# Patient Record
Sex: FEMALE | Race: WHITE | NOT HISPANIC OR LATINO | URBAN - METROPOLITAN AREA
[De-identification: names, ages, dates, MRNs, and addresses within clinical notes are randomized per-mention and may not be internally consistent; named-entity substitution may affect disease eponyms.]

---

## 2017-01-07 ENCOUNTER — OUTPATIENT (OUTPATIENT)
Dept: OUTPATIENT SERVICES | Facility: HOSPITAL | Age: 67
LOS: 1 days | End: 2017-01-07
Payer: COMMERCIAL

## 2017-01-07 ENCOUNTER — APPOINTMENT (OUTPATIENT)
Dept: MAMMOGRAPHY | Facility: CLINIC | Age: 67
End: 2017-01-07

## 2017-01-07 DIAGNOSIS — Z98.89 OTHER SPECIFIED POSTPROCEDURAL STATES: Chronic | ICD-10-CM

## 2017-01-07 DIAGNOSIS — Z00.8 ENCOUNTER FOR OTHER GENERAL EXAMINATION: ICD-10-CM

## 2017-01-07 DIAGNOSIS — Z90.89 ACQUIRED ABSENCE OF OTHER ORGANS: Chronic | ICD-10-CM

## 2017-01-07 DIAGNOSIS — Z90.710 ACQUIRED ABSENCE OF BOTH CERVIX AND UTERUS: Chronic | ICD-10-CM

## 2017-01-07 PROCEDURE — 77067 SCR MAMMO BI INCL CAD: CPT

## 2017-01-07 PROCEDURE — 77063 BREAST TOMOSYNTHESIS BI: CPT | Mod: 26

## 2017-01-07 PROCEDURE — 77063 BREAST TOMOSYNTHESIS BI: CPT

## 2017-01-07 PROCEDURE — G0202: CPT | Mod: 26

## 2017-04-23 ENCOUNTER — EMERGENCY (EMERGENCY)
Facility: HOSPITAL | Age: 67
LOS: 1 days | Discharge: HOME | End: 2017-04-23
Attending: EMERGENCY MEDICINE | Admitting: EMERGENCY MEDICINE
Payer: COMMERCIAL

## 2017-04-23 VITALS
RESPIRATION RATE: 15 BRPM | OXYGEN SATURATION: 98 % | HEART RATE: 78 BPM | SYSTOLIC BLOOD PRESSURE: 156 MMHG | TEMPERATURE: 98 F | DIASTOLIC BLOOD PRESSURE: 81 MMHG

## 2017-04-23 VITALS
RESPIRATION RATE: 15 BRPM | TEMPERATURE: 98 F | HEART RATE: 79 BPM | DIASTOLIC BLOOD PRESSURE: 81 MMHG | OXYGEN SATURATION: 99 % | SYSTOLIC BLOOD PRESSURE: 156 MMHG | WEIGHT: 156.09 LBS

## 2017-04-23 DIAGNOSIS — Z98.89 OTHER SPECIFIED POSTPROCEDURAL STATES: Chronic | ICD-10-CM

## 2017-04-23 DIAGNOSIS — Z90.89 ACQUIRED ABSENCE OF OTHER ORGANS: Chronic | ICD-10-CM

## 2017-04-23 DIAGNOSIS — Z90.710 ACQUIRED ABSENCE OF BOTH CERVIX AND UTERUS: Chronic | ICD-10-CM

## 2017-04-23 PROCEDURE — 99282 EMERGENCY DEPT VISIT SF MDM: CPT

## 2017-04-23 PROCEDURE — 99283 EMERGENCY DEPT VISIT LOW MDM: CPT

## 2017-04-23 RX ADMIN — Medication 1 TABLET(S): at 20:57

## 2017-04-23 NOTE — ED ADULT NURSE NOTE - OBJECTIVE STATEMENT
pt seen and treated at urgent care for dog bite to hand. augmentin rx was supposed to be sent to her pharmacy but never was. pt here for rx only

## 2017-04-23 NOTE — ED ADULT NURSE NOTE - PSH
History of bladder surgery  bladder lift, titanium screw to coccyx  S/P hysterectomy    S/P tonsillectomy

## 2017-04-27 DIAGNOSIS — Z76.0 ENCOUNTER FOR ISSUE OF REPEAT PRESCRIPTION: ICD-10-CM

## 2017-05-01 ENCOUNTER — OUTPATIENT (OUTPATIENT)
Dept: OUTPATIENT SERVICES | Facility: HOSPITAL | Age: 67
LOS: 1 days | End: 2017-05-01
Payer: COMMERCIAL

## 2017-05-01 ENCOUNTER — APPOINTMENT (OUTPATIENT)
Dept: RADIOLOGY | Facility: CLINIC | Age: 67
End: 2017-05-01

## 2017-05-01 DIAGNOSIS — Z90.89 ACQUIRED ABSENCE OF OTHER ORGANS: Chronic | ICD-10-CM

## 2017-05-01 DIAGNOSIS — Z90.710 ACQUIRED ABSENCE OF BOTH CERVIX AND UTERUS: Chronic | ICD-10-CM

## 2017-05-01 DIAGNOSIS — Z98.89 OTHER SPECIFIED POSTPROCEDURAL STATES: Chronic | ICD-10-CM

## 2017-05-01 DIAGNOSIS — Z00.8 ENCOUNTER FOR OTHER GENERAL EXAMINATION: ICD-10-CM

## 2017-05-01 PROCEDURE — 71046 X-RAY EXAM CHEST 2 VIEWS: CPT

## 2017-05-12 ENCOUNTER — APPOINTMENT (OUTPATIENT)
Dept: OTOLARYNGOLOGY | Facility: CLINIC | Age: 67
End: 2017-05-12

## 2017-05-12 VITALS
HEIGHT: 62 IN | WEIGHT: 155 LBS | SYSTOLIC BLOOD PRESSURE: 122 MMHG | DIASTOLIC BLOOD PRESSURE: 79 MMHG | HEART RATE: 66 BPM | BODY MASS INDEX: 28.52 KG/M2

## 2017-05-12 DIAGNOSIS — Z87.891 PERSONAL HISTORY OF NICOTINE DEPENDENCE: ICD-10-CM

## 2017-06-08 ENCOUNTER — APPOINTMENT (OUTPATIENT)
Dept: OTOLARYNGOLOGY | Facility: CLINIC | Age: 67
End: 2017-06-08

## 2017-06-08 VITALS
BODY MASS INDEX: 27.97 KG/M2 | SYSTOLIC BLOOD PRESSURE: 142 MMHG | WEIGHT: 152 LBS | HEART RATE: 69 BPM | DIASTOLIC BLOOD PRESSURE: 86 MMHG | HEIGHT: 62 IN

## 2017-07-26 ENCOUNTER — MEDICATION RENEWAL (OUTPATIENT)
Age: 67
End: 2017-07-26

## 2017-07-26 DIAGNOSIS — K11.1 HYPERTROPHY OF SALIVARY GLAND: ICD-10-CM

## 2017-07-27 ENCOUNTER — FORM ENCOUNTER (OUTPATIENT)
Age: 67
End: 2017-07-27

## 2017-07-28 ENCOUNTER — OUTPATIENT (OUTPATIENT)
Dept: OUTPATIENT SERVICES | Facility: HOSPITAL | Age: 67
LOS: 1 days | End: 2017-07-28
Payer: COMMERCIAL

## 2017-07-28 ENCOUNTER — APPOINTMENT (OUTPATIENT)
Dept: CT IMAGING | Facility: CLINIC | Age: 67
End: 2017-07-28
Payer: COMMERCIAL

## 2017-07-28 DIAGNOSIS — Z90.710 ACQUIRED ABSENCE OF BOTH CERVIX AND UTERUS: Chronic | ICD-10-CM

## 2017-07-28 DIAGNOSIS — Z98.89 OTHER SPECIFIED POSTPROCEDURAL STATES: Chronic | ICD-10-CM

## 2017-07-28 DIAGNOSIS — Z00.8 ENCOUNTER FOR OTHER GENERAL EXAMINATION: ICD-10-CM

## 2017-07-28 DIAGNOSIS — Z90.89 ACQUIRED ABSENCE OF OTHER ORGANS: Chronic | ICD-10-CM

## 2017-07-28 PROCEDURE — 70491 CT SOFT TISSUE NECK W/DYE: CPT

## 2017-07-28 PROCEDURE — 82565 ASSAY OF CREATININE: CPT

## 2017-07-28 PROCEDURE — 70491 CT SOFT TISSUE NECK W/DYE: CPT | Mod: 26

## 2017-07-31 ENCOUNTER — APPOINTMENT (OUTPATIENT)
Dept: OTOLARYNGOLOGY | Facility: CLINIC | Age: 67
End: 2017-07-31
Payer: COMMERCIAL

## 2017-07-31 VITALS
DIASTOLIC BLOOD PRESSURE: 83 MMHG | HEART RATE: 64 BPM | WEIGHT: 152 LBS | HEIGHT: 62 IN | BODY MASS INDEX: 27.97 KG/M2 | SYSTOLIC BLOOD PRESSURE: 143 MMHG

## 2017-07-31 DIAGNOSIS — M54.2 CERVICALGIA: ICD-10-CM

## 2017-07-31 PROCEDURE — 99214 OFFICE O/P EST MOD 30 MIN: CPT

## 2017-08-19 ENCOUNTER — APPOINTMENT (OUTPATIENT)
Dept: CT IMAGING | Facility: IMAGING CENTER | Age: 67
End: 2017-08-19
Payer: COMMERCIAL

## 2017-08-19 ENCOUNTER — OUTPATIENT (OUTPATIENT)
Dept: OUTPATIENT SERVICES | Facility: HOSPITAL | Age: 67
LOS: 1 days | End: 2017-08-19
Payer: COMMERCIAL

## 2017-08-19 DIAGNOSIS — Z90.89 ACQUIRED ABSENCE OF OTHER ORGANS: Chronic | ICD-10-CM

## 2017-08-19 DIAGNOSIS — Z00.8 ENCOUNTER FOR OTHER GENERAL EXAMINATION: ICD-10-CM

## 2017-08-19 DIAGNOSIS — Z98.89 OTHER SPECIFIED POSTPROCEDURAL STATES: Chronic | ICD-10-CM

## 2017-08-19 DIAGNOSIS — Z90.710 ACQUIRED ABSENCE OF BOTH CERVIX AND UTERUS: Chronic | ICD-10-CM

## 2017-08-19 PROCEDURE — 71260 CT THORAX DX C+: CPT | Mod: 26

## 2017-08-19 PROCEDURE — 82565 ASSAY OF CREATININE: CPT

## 2017-08-19 PROCEDURE — 71260 CT THORAX DX C+: CPT

## 2017-08-22 ENCOUNTER — APPOINTMENT (OUTPATIENT)
Dept: MAMMOGRAPHY | Facility: IMAGING CENTER | Age: 67
End: 2017-08-22
Payer: COMMERCIAL

## 2017-08-22 ENCOUNTER — APPOINTMENT (OUTPATIENT)
Dept: ULTRASOUND IMAGING | Facility: IMAGING CENTER | Age: 67
End: 2017-08-22

## 2017-08-22 ENCOUNTER — OUTPATIENT (OUTPATIENT)
Dept: OUTPATIENT SERVICES | Facility: HOSPITAL | Age: 67
LOS: 1 days | End: 2017-08-22
Payer: COMMERCIAL

## 2017-08-22 DIAGNOSIS — Z90.710 ACQUIRED ABSENCE OF BOTH CERVIX AND UTERUS: Chronic | ICD-10-CM

## 2017-08-22 DIAGNOSIS — Z98.89 OTHER SPECIFIED POSTPROCEDURAL STATES: Chronic | ICD-10-CM

## 2017-08-22 DIAGNOSIS — Z00.8 ENCOUNTER FOR OTHER GENERAL EXAMINATION: ICD-10-CM

## 2017-08-22 DIAGNOSIS — Z90.89 ACQUIRED ABSENCE OF OTHER ORGANS: Chronic | ICD-10-CM

## 2017-08-22 PROCEDURE — G0206: CPT | Mod: 26,LT

## 2017-08-22 PROCEDURE — 76641 ULTRASOUND BREAST COMPLETE: CPT

## 2017-08-22 PROCEDURE — G0279: CPT

## 2017-08-22 PROCEDURE — 77065 DX MAMMO INCL CAD UNI: CPT

## 2017-08-22 PROCEDURE — 76641 ULTRASOUND BREAST COMPLETE: CPT | Mod: 26,LT

## 2017-08-22 PROCEDURE — G0279: CPT | Mod: 26

## 2017-09-05 ENCOUNTER — APPOINTMENT (OUTPATIENT)
Dept: SURGERY | Facility: CLINIC | Age: 67
End: 2017-09-05
Payer: COMMERCIAL

## 2017-09-05 VITALS
SYSTOLIC BLOOD PRESSURE: 145 MMHG | DIASTOLIC BLOOD PRESSURE: 80 MMHG | BODY MASS INDEX: 27.6 KG/M2 | WEIGHT: 150 LBS | HEIGHT: 62 IN

## 2017-09-05 PROCEDURE — 99203 OFFICE O/P NEW LOW 30 MIN: CPT

## 2017-09-30 ENCOUNTER — APPOINTMENT (OUTPATIENT)
Dept: ULTRASOUND IMAGING | Facility: IMAGING CENTER | Age: 67
End: 2017-09-30

## 2017-09-30 ENCOUNTER — APPOINTMENT (OUTPATIENT)
Dept: MAMMOGRAPHY | Facility: IMAGING CENTER | Age: 67
End: 2017-09-30

## 2017-10-31 ENCOUNTER — TRANSCRIPTION ENCOUNTER (OUTPATIENT)
Age: 67
End: 2017-10-31

## 2017-11-01 ENCOUNTER — RESULT REVIEW (OUTPATIENT)
Age: 67
End: 2017-11-01

## 2017-11-08 ENCOUNTER — TRANSCRIPTION ENCOUNTER (OUTPATIENT)
Age: 67
End: 2017-11-08

## 2018-03-01 ENCOUNTER — APPOINTMENT (OUTPATIENT)
Dept: SURGICAL ONCOLOGY | Facility: CLINIC | Age: 68
End: 2018-03-01
Payer: COMMERCIAL

## 2018-03-01 VITALS
SYSTOLIC BLOOD PRESSURE: 148 MMHG | HEIGHT: 62 IN | BODY MASS INDEX: 30.36 KG/M2 | HEART RATE: 69 BPM | DIASTOLIC BLOOD PRESSURE: 86 MMHG | OXYGEN SATURATION: 97 % | WEIGHT: 165 LBS

## 2018-03-01 DIAGNOSIS — M79.622 PAIN IN LEFT UPPER ARM: ICD-10-CM

## 2018-03-01 PROCEDURE — 99243 OFF/OP CNSLTJ NEW/EST LOW 30: CPT

## 2018-03-07 ENCOUNTER — TRANSCRIPTION ENCOUNTER (OUTPATIENT)
Age: 68
End: 2018-03-07

## 2018-03-27 ENCOUNTER — FORM ENCOUNTER (OUTPATIENT)
Age: 68
End: 2018-03-27

## 2018-03-28 ENCOUNTER — APPOINTMENT (OUTPATIENT)
Dept: ULTRASOUND IMAGING | Facility: IMAGING CENTER | Age: 68
End: 2018-03-28
Payer: COMMERCIAL

## 2018-03-28 ENCOUNTER — OUTPATIENT (OUTPATIENT)
Dept: OUTPATIENT SERVICES | Facility: HOSPITAL | Age: 68
LOS: 1 days | End: 2018-03-28
Payer: COMMERCIAL

## 2018-03-28 ENCOUNTER — APPOINTMENT (OUTPATIENT)
Dept: MAMMOGRAPHY | Facility: IMAGING CENTER | Age: 68
End: 2018-03-28
Payer: COMMERCIAL

## 2018-03-28 DIAGNOSIS — M79.622 PAIN IN LEFT UPPER ARM: ICD-10-CM

## 2018-03-28 DIAGNOSIS — Z98.89 OTHER SPECIFIED POSTPROCEDURAL STATES: Chronic | ICD-10-CM

## 2018-03-28 DIAGNOSIS — Z90.89 ACQUIRED ABSENCE OF OTHER ORGANS: Chronic | ICD-10-CM

## 2018-03-28 DIAGNOSIS — Z90.710 ACQUIRED ABSENCE OF BOTH CERVIX AND UTERUS: Chronic | ICD-10-CM

## 2018-03-28 PROCEDURE — G0279: CPT | Mod: 26

## 2018-03-28 PROCEDURE — G0279: CPT

## 2018-03-28 PROCEDURE — 77066 DX MAMMO INCL CAD BI: CPT | Mod: 26

## 2018-03-28 PROCEDURE — 76641 ULTRASOUND BREAST COMPLETE: CPT

## 2018-03-28 PROCEDURE — 76641 ULTRASOUND BREAST COMPLETE: CPT | Mod: 26,50

## 2018-03-28 PROCEDURE — 77066 DX MAMMO INCL CAD BI: CPT

## 2018-04-10 ENCOUNTER — FORM ENCOUNTER (OUTPATIENT)
Age: 68
End: 2018-04-10

## 2018-04-11 ENCOUNTER — APPOINTMENT (OUTPATIENT)
Dept: MRI IMAGING | Facility: CLINIC | Age: 68
End: 2018-04-11
Payer: COMMERCIAL

## 2018-04-11 ENCOUNTER — OUTPATIENT (OUTPATIENT)
Dept: OUTPATIENT SERVICES | Facility: HOSPITAL | Age: 68
LOS: 1 days | End: 2018-04-11
Payer: COMMERCIAL

## 2018-04-11 DIAGNOSIS — Z00.8 ENCOUNTER FOR OTHER GENERAL EXAMINATION: ICD-10-CM

## 2018-04-11 DIAGNOSIS — Z90.89 ACQUIRED ABSENCE OF OTHER ORGANS: Chronic | ICD-10-CM

## 2018-04-11 DIAGNOSIS — Z90.710 ACQUIRED ABSENCE OF BOTH CERVIX AND UTERUS: Chronic | ICD-10-CM

## 2018-04-11 DIAGNOSIS — Z98.89 OTHER SPECIFIED POSTPROCEDURAL STATES: Chronic | ICD-10-CM

## 2018-04-11 PROCEDURE — A9585: CPT

## 2018-04-11 PROCEDURE — C8908: CPT

## 2018-04-11 PROCEDURE — C8937: CPT

## 2018-04-11 PROCEDURE — 0159T: CPT | Mod: 26

## 2018-04-11 PROCEDURE — 77059 MRI BREAST BILATERAL: CPT | Mod: 26

## 2018-10-16 ENCOUNTER — APPOINTMENT (OUTPATIENT)
Dept: ULTRASOUND IMAGING | Facility: CLINIC | Age: 68
End: 2018-10-16

## 2018-10-16 ENCOUNTER — APPOINTMENT (OUTPATIENT)
Dept: MRI IMAGING | Facility: CLINIC | Age: 68
End: 2018-10-16

## 2018-10-16 ENCOUNTER — OUTPATIENT (OUTPATIENT)
Dept: OUTPATIENT SERVICES | Facility: HOSPITAL | Age: 68
LOS: 1 days | End: 2018-10-16
Payer: COMMERCIAL

## 2018-10-16 DIAGNOSIS — Z90.89 ACQUIRED ABSENCE OF OTHER ORGANS: Chronic | ICD-10-CM

## 2018-10-16 DIAGNOSIS — Z90.710 ACQUIRED ABSENCE OF BOTH CERVIX AND UTERUS: Chronic | ICD-10-CM

## 2018-10-16 DIAGNOSIS — Z98.89 OTHER SPECIFIED POSTPROCEDURAL STATES: Chronic | ICD-10-CM

## 2018-10-16 DIAGNOSIS — Z00.8 ENCOUNTER FOR OTHER GENERAL EXAMINATION: ICD-10-CM

## 2018-10-16 DIAGNOSIS — Z00.00 ENCOUNTER FOR GENERAL ADULT MEDICAL EXAMINATION WITHOUT ABNORMAL FINDINGS: ICD-10-CM

## 2018-10-16 LAB
ALBUMIN SERPL ELPH-MCNC: 3.7 G/DL — SIGNIFICANT CHANGE UP (ref 3.3–5)
ALP SERPL-CCNC: 72 U/L — SIGNIFICANT CHANGE UP (ref 30–120)
ALT FLD-CCNC: 22 U/L DA — SIGNIFICANT CHANGE UP (ref 10–60)
ANION GAP SERPL CALC-SCNC: 5 MMOL/L — SIGNIFICANT CHANGE UP (ref 5–17)
AST SERPL-CCNC: 16 U/L — SIGNIFICANT CHANGE UP (ref 10–40)
BASOPHILS # BLD AUTO: 0.04 K/UL — SIGNIFICANT CHANGE UP (ref 0–0.2)
BASOPHILS NFR BLD AUTO: 0.6 % — SIGNIFICANT CHANGE UP (ref 0–2)
BILIRUB SERPL-MCNC: 0.6 MG/DL — SIGNIFICANT CHANGE UP (ref 0.2–1.2)
BUN SERPL-MCNC: 13 MG/DL — SIGNIFICANT CHANGE UP (ref 7–23)
CALCIUM SERPL-MCNC: 9.6 MG/DL — SIGNIFICANT CHANGE UP (ref 8.4–10.5)
CHLORIDE SERPL-SCNC: 106 MMOL/L — SIGNIFICANT CHANGE UP (ref 96–108)
CO2 SERPL-SCNC: 32 MMOL/L — HIGH (ref 22–31)
CREAT SERPL-MCNC: 0.63 MG/DL — SIGNIFICANT CHANGE UP (ref 0.5–1.3)
EOSINOPHIL # BLD AUTO: 0.29 K/UL — SIGNIFICANT CHANGE UP (ref 0–0.5)
EOSINOPHIL NFR BLD AUTO: 4.4 % — SIGNIFICANT CHANGE UP (ref 0–6)
GLUCOSE SERPL-MCNC: 99 MG/DL — SIGNIFICANT CHANGE UP (ref 70–99)
HCT VFR BLD CALC: 44.3 % — SIGNIFICANT CHANGE UP (ref 34.5–45)
HGB BLD-MCNC: 14.7 G/DL — SIGNIFICANT CHANGE UP (ref 11.5–15.5)
IMM GRANULOCYTES NFR BLD AUTO: 0.5 % — SIGNIFICANT CHANGE UP (ref 0–1.5)
LYMPHOCYTES # BLD AUTO: 1.61 K/UL — SIGNIFICANT CHANGE UP (ref 1–3.3)
LYMPHOCYTES # BLD AUTO: 24.3 % — SIGNIFICANT CHANGE UP (ref 13–44)
MCHC RBC-ENTMCNC: 30.4 PG — SIGNIFICANT CHANGE UP (ref 27–34)
MCHC RBC-ENTMCNC: 33.2 GM/DL — SIGNIFICANT CHANGE UP (ref 32–36)
MCV RBC AUTO: 91.7 FL — SIGNIFICANT CHANGE UP (ref 80–100)
MONOCYTES # BLD AUTO: 0.34 K/UL — SIGNIFICANT CHANGE UP (ref 0–0.9)
MONOCYTES NFR BLD AUTO: 5.1 % — SIGNIFICANT CHANGE UP (ref 2–14)
NEUTROPHILS # BLD AUTO: 4.31 K/UL — SIGNIFICANT CHANGE UP (ref 1.8–7.4)
NEUTROPHILS NFR BLD AUTO: 65.1 % — SIGNIFICANT CHANGE UP (ref 43–77)
NRBC # BLD: 0 /100 WBCS — SIGNIFICANT CHANGE UP (ref 0–0)
PLATELET # BLD AUTO: 251 K/UL — SIGNIFICANT CHANGE UP (ref 150–400)
POTASSIUM SERPL-MCNC: 4.8 MMOL/L — SIGNIFICANT CHANGE UP (ref 3.5–5.3)
POTASSIUM SERPL-SCNC: 4.8 MMOL/L — SIGNIFICANT CHANGE UP (ref 3.5–5.3)
PROT SERPL-MCNC: 7.3 G/DL — SIGNIFICANT CHANGE UP (ref 6–8.3)
RBC # BLD: 4.83 M/UL — SIGNIFICANT CHANGE UP (ref 3.8–5.2)
RBC # FLD: 12.2 % — SIGNIFICANT CHANGE UP (ref 10.3–14.5)
SODIUM SERPL-SCNC: 143 MMOL/L — SIGNIFICANT CHANGE UP (ref 135–145)
TSH SERPL-MCNC: 1.96 UIU/ML — SIGNIFICANT CHANGE UP (ref 0.27–4.2)
WBC # BLD: 6.62 K/UL — SIGNIFICANT CHANGE UP (ref 3.8–10.5)
WBC # FLD AUTO: 6.62 K/UL — SIGNIFICANT CHANGE UP (ref 3.8–10.5)

## 2018-10-16 PROCEDURE — 70553 MRI BRAIN STEM W/O & W/DYE: CPT | Mod: 26

## 2018-10-16 PROCEDURE — 86618 LYME DISEASE ANTIBODY: CPT

## 2018-10-16 PROCEDURE — 82306 VITAMIN D 25 HYDROXY: CPT

## 2018-10-16 PROCEDURE — 93880 EXTRACRANIAL BILAT STUDY: CPT | Mod: 26

## 2018-10-16 PROCEDURE — 85027 COMPLETE CBC AUTOMATED: CPT

## 2018-10-16 PROCEDURE — 36415 COLL VENOUS BLD VENIPUNCTURE: CPT

## 2018-10-16 PROCEDURE — 70553 MRI BRAIN STEM W/O & W/DYE: CPT

## 2018-10-16 PROCEDURE — 80053 COMPREHEN METABOLIC PANEL: CPT

## 2018-10-16 PROCEDURE — 84443 ASSAY THYROID STIM HORMONE: CPT

## 2018-10-16 PROCEDURE — 80061 LIPID PANEL: CPT

## 2018-10-16 PROCEDURE — 70546 MR ANGIOGRAPH HEAD W/O&W/DYE: CPT

## 2018-10-16 PROCEDURE — A9585: CPT

## 2018-10-16 PROCEDURE — 93880 EXTRACRANIAL BILAT STUDY: CPT

## 2018-10-17 LAB
24R-OH-CALCIDIOL SERPL-MCNC: 30.4 NG/ML — SIGNIFICANT CHANGE UP (ref 30–80)
B BURGDOR C6 AB SER-ACNC: NEGATIVE — SIGNIFICANT CHANGE UP
B BURGDOR IGG+IGM SER-ACNC: 0.08 INDEX — SIGNIFICANT CHANGE UP (ref 0.01–0.89)
CHOLEST SERPL-MCNC: 244 MG/DL — HIGH (ref 10–199)
HDLC SERPL-MCNC: 74 MG/DL — SIGNIFICANT CHANGE UP
LIPID PNL WITH DIRECT LDL SERPL: 142 MG/DL — HIGH
LYME C6 AB IGG/IGM EIA REFLEX WESTERN BL: SIGNIFICANT CHANGE UP
TOTAL CHOLESTEROL/HDL RATIO MEASUREMENT: 3.3 RATIO — SIGNIFICANT CHANGE UP (ref 3.3–7.1)
TRIGL SERPL-MCNC: 139 MG/DL — SIGNIFICANT CHANGE UP (ref 10–149)

## 2018-10-24 ENCOUNTER — OUTPATIENT (OUTPATIENT)
Dept: OUTPATIENT SERVICES | Facility: HOSPITAL | Age: 68
LOS: 1 days | End: 2018-10-24
Payer: COMMERCIAL

## 2018-10-24 DIAGNOSIS — Z98.89 OTHER SPECIFIED POSTPROCEDURAL STATES: Chronic | ICD-10-CM

## 2018-10-24 DIAGNOSIS — Z00.00 ENCOUNTER FOR GENERAL ADULT MEDICAL EXAMINATION WITHOUT ABNORMAL FINDINGS: ICD-10-CM

## 2018-10-24 DIAGNOSIS — Z90.710 ACQUIRED ABSENCE OF BOTH CERVIX AND UTERUS: Chronic | ICD-10-CM

## 2018-10-24 DIAGNOSIS — Z90.89 ACQUIRED ABSENCE OF OTHER ORGANS: Chronic | ICD-10-CM

## 2018-10-24 LAB
ALBUMIN SERPL ELPH-MCNC: 3.4 G/DL — SIGNIFICANT CHANGE UP (ref 3.3–5)
ALP SERPL-CCNC: 67 U/L — SIGNIFICANT CHANGE UP (ref 40–120)
ALT FLD-CCNC: 19 U/L — SIGNIFICANT CHANGE UP (ref 12–78)
ANION GAP SERPL CALC-SCNC: 5 MMOL/L — SIGNIFICANT CHANGE UP (ref 5–17)
AST SERPL-CCNC: 13 U/L — LOW (ref 15–37)
BASOPHILS # BLD AUTO: 0.03 K/UL — SIGNIFICANT CHANGE UP (ref 0–0.2)
BASOPHILS NFR BLD AUTO: 0.4 % — SIGNIFICANT CHANGE UP (ref 0–2)
BILIRUB SERPL-MCNC: 0.7 MG/DL — SIGNIFICANT CHANGE UP (ref 0.2–1.2)
BUN SERPL-MCNC: 15 MG/DL — SIGNIFICANT CHANGE UP (ref 7–23)
CALCIUM SERPL-MCNC: 9.1 MG/DL — SIGNIFICANT CHANGE UP (ref 8.5–10.1)
CHLORIDE SERPL-SCNC: 106 MMOL/L — SIGNIFICANT CHANGE UP (ref 96–108)
CHOLEST SERPL-MCNC: 221 MG/DL — HIGH (ref 10–199)
CO2 SERPL-SCNC: 31 MMOL/L — SIGNIFICANT CHANGE UP (ref 22–31)
CREAT SERPL-MCNC: 0.61 MG/DL — SIGNIFICANT CHANGE UP (ref 0.5–1.3)
CRP SERPL-MCNC: 0.42 MG/DL — HIGH (ref 0–0.4)
EOSINOPHIL # BLD AUTO: 0.28 K/UL — SIGNIFICANT CHANGE UP (ref 0–0.5)
EOSINOPHIL NFR BLD AUTO: 3.8 % — SIGNIFICANT CHANGE UP (ref 0–6)
ERYTHROCYTE [SEDIMENTATION RATE] IN BLOOD: 23 MM/HR — HIGH (ref 0–20)
GLUCOSE SERPL-MCNC: 87 MG/DL — SIGNIFICANT CHANGE UP (ref 70–99)
HCT VFR BLD CALC: 43.2 % — SIGNIFICANT CHANGE UP (ref 34.5–45)
HDLC SERPL-MCNC: 71 MG/DL — SIGNIFICANT CHANGE UP
HGB BLD-MCNC: 14.4 G/DL — SIGNIFICANT CHANGE UP (ref 11.5–15.5)
IMM GRANULOCYTES NFR BLD AUTO: 0.4 % — SIGNIFICANT CHANGE UP (ref 0–1.5)
LIPID PNL WITH DIRECT LDL SERPL: 136 MG/DL — HIGH
LYMPHOCYTES # BLD AUTO: 1.4 K/UL — SIGNIFICANT CHANGE UP (ref 1–3.3)
LYMPHOCYTES # BLD AUTO: 18.8 % — SIGNIFICANT CHANGE UP (ref 13–44)
MCHC RBC-ENTMCNC: 30.1 PG — SIGNIFICANT CHANGE UP (ref 27–34)
MCHC RBC-ENTMCNC: 33.3 GM/DL — SIGNIFICANT CHANGE UP (ref 32–36)
MCV RBC AUTO: 90.2 FL — SIGNIFICANT CHANGE UP (ref 80–100)
MONOCYTES # BLD AUTO: 0.38 K/UL — SIGNIFICANT CHANGE UP (ref 0–0.9)
MONOCYTES NFR BLD AUTO: 5.1 % — SIGNIFICANT CHANGE UP (ref 2–14)
NEUTROPHILS # BLD AUTO: 5.33 K/UL — SIGNIFICANT CHANGE UP (ref 1.8–7.4)
NEUTROPHILS NFR BLD AUTO: 71.5 % — SIGNIFICANT CHANGE UP (ref 43–77)
NRBC # BLD: 0 /100 WBCS — SIGNIFICANT CHANGE UP (ref 0–0)
PLATELET # BLD AUTO: 246 K/UL — SIGNIFICANT CHANGE UP (ref 150–400)
POTASSIUM SERPL-MCNC: 4.6 MMOL/L — SIGNIFICANT CHANGE UP (ref 3.5–5.3)
POTASSIUM SERPL-SCNC: 4.6 MMOL/L — SIGNIFICANT CHANGE UP (ref 3.5–5.3)
PROT SERPL-MCNC: 7.4 G/DL — SIGNIFICANT CHANGE UP (ref 6–8.3)
RBC # BLD: 4.79 M/UL — SIGNIFICANT CHANGE UP (ref 3.8–5.2)
RBC # FLD: 12.3 % — SIGNIFICANT CHANGE UP (ref 10.3–14.5)
RHEUMATOID FACT SERPL-ACNC: 10 IU/ML — SIGNIFICANT CHANGE UP (ref 0–13)
SODIUM SERPL-SCNC: 142 MMOL/L — SIGNIFICANT CHANGE UP (ref 135–145)
T PALLIDUM AB TITR SER: NEGATIVE — SIGNIFICANT CHANGE UP
TOTAL CHOLESTEROL/HDL RATIO MEASUREMENT: 3.1 RATIO — LOW (ref 3.3–7.1)
TRIGL SERPL-MCNC: 72 MG/DL — SIGNIFICANT CHANGE UP (ref 10–149)
VIT B12 SERPL-MCNC: >2000 PG/ML — HIGH (ref 232–1245)
WBC # BLD: 7.45 K/UL — SIGNIFICANT CHANGE UP (ref 3.8–10.5)
WBC # FLD AUTO: 7.45 K/UL — SIGNIFICANT CHANGE UP (ref 3.8–10.5)

## 2018-10-24 PROCEDURE — 86431 RHEUMATOID FACTOR QUANT: CPT

## 2018-10-24 PROCEDURE — 86780 TREPONEMA PALLIDUM: CPT

## 2018-10-24 PROCEDURE — 86140 C-REACTIVE PROTEIN: CPT

## 2018-10-24 PROCEDURE — 85027 COMPLETE CBC AUTOMATED: CPT

## 2018-10-24 PROCEDURE — 80061 LIPID PANEL: CPT

## 2018-10-24 PROCEDURE — 85652 RBC SED RATE AUTOMATED: CPT

## 2018-10-24 PROCEDURE — 86592 SYPHILIS TEST NON-TREP QUAL: CPT

## 2018-10-24 PROCEDURE — 82607 VITAMIN B-12: CPT

## 2018-10-24 PROCEDURE — 80053 COMPREHEN METABOLIC PANEL: CPT

## 2019-04-25 ENCOUNTER — RESULT REVIEW (OUTPATIENT)
Age: 69
End: 2019-04-25

## 2019-05-14 ENCOUNTER — OUTPATIENT (OUTPATIENT)
Dept: OUTPATIENT SERVICES | Facility: HOSPITAL | Age: 69
LOS: 1 days | End: 2019-05-14
Payer: COMMERCIAL

## 2019-05-14 ENCOUNTER — APPOINTMENT (OUTPATIENT)
Dept: MAMMOGRAPHY | Facility: CLINIC | Age: 69
End: 2019-05-14
Payer: COMMERCIAL

## 2019-05-14 DIAGNOSIS — Z90.710 ACQUIRED ABSENCE OF BOTH CERVIX AND UTERUS: Chronic | ICD-10-CM

## 2019-05-14 DIAGNOSIS — Z00.8 ENCOUNTER FOR OTHER GENERAL EXAMINATION: ICD-10-CM

## 2019-05-14 DIAGNOSIS — Z98.89 OTHER SPECIFIED POSTPROCEDURAL STATES: Chronic | ICD-10-CM

## 2019-05-14 DIAGNOSIS — Z90.89 ACQUIRED ABSENCE OF OTHER ORGANS: Chronic | ICD-10-CM

## 2019-05-14 PROCEDURE — 77063 BREAST TOMOSYNTHESIS BI: CPT

## 2019-05-14 PROCEDURE — 77067 SCR MAMMO BI INCL CAD: CPT | Mod: 26

## 2019-05-14 PROCEDURE — 77067 SCR MAMMO BI INCL CAD: CPT

## 2019-05-14 PROCEDURE — 77063 BREAST TOMOSYNTHESIS BI: CPT | Mod: 26

## 2019-09-18 ENCOUNTER — APPOINTMENT (OUTPATIENT)
Dept: INTERNAL MEDICINE | Facility: CLINIC | Age: 69
End: 2019-09-18
Payer: MEDICARE

## 2019-09-18 VITALS
HEIGHT: 62 IN | OXYGEN SATURATION: 95 % | WEIGHT: 155 LBS | HEART RATE: 80 BPM | BODY MASS INDEX: 28.52 KG/M2 | SYSTOLIC BLOOD PRESSURE: 148 MMHG | DIASTOLIC BLOOD PRESSURE: 89 MMHG

## 2019-09-18 PROCEDURE — 99213 OFFICE O/P EST LOW 20 MIN: CPT

## 2019-09-18 NOTE — HISTORY OF PRESENT ILLNESS
[FreeTextEntry8] : Pt comes for cough and sore throat for 1 week  Went to urgent care and given medrol and flonase  No antibiotic  still coughing with congestion

## 2019-09-18 NOTE — PHYSICAL EXAM
[No Acute Distress] : no acute distress [Normal Sclera/Conjunctiva] : normal sclera/conjunctiva [Normal Outer Ear/Nose] : the outer ears and nose were normal in appearance [No JVD] : no jugular venous distention [Normal Rate] : normal rate  [Regular Rhythm] : with a regular rhythm [No Edema] : there was no peripheral edema [No Extremity Clubbing/Cyanosis] : no extremity clubbing/cyanosis [de-identified] : throat 3 + erytema [de-identified] : elza rhonchi faine exp wheezze

## 2019-09-18 NOTE — REVIEW OF SYSTEMS
[Earache] : earache [Hoarseness] : hoarseness [Nasal Discharge] : nasal discharge [Sore Throat] : sore throat [Postnasal Drip] : postnasal drip [Wheezing] : wheezing [Cough] : cough [Fever] : no fever [Chills] : no chills [Chest Pain] : no chest pain [Palpitations] : no palpitations [Shortness Of Breath] : no shortness of breath

## 2019-10-14 ENCOUNTER — APPOINTMENT (OUTPATIENT)
Dept: INTERNAL MEDICINE | Facility: CLINIC | Age: 69
End: 2019-10-14
Payer: MEDICARE

## 2019-10-14 ENCOUNTER — APPOINTMENT (OUTPATIENT)
Dept: RADIOLOGY | Facility: CLINIC | Age: 69
End: 2019-10-14
Payer: MEDICARE

## 2019-10-14 ENCOUNTER — LABORATORY RESULT (OUTPATIENT)
Age: 69
End: 2019-10-14

## 2019-10-14 ENCOUNTER — OUTPATIENT (OUTPATIENT)
Dept: OUTPATIENT SERVICES | Facility: HOSPITAL | Age: 69
LOS: 1 days | End: 2019-10-14
Payer: MEDICARE

## 2019-10-14 VITALS
TEMPERATURE: 98.1 F | HEIGHT: 62 IN | WEIGHT: 156 LBS | OXYGEN SATURATION: 97 % | BODY MASS INDEX: 28.71 KG/M2 | HEART RATE: 72 BPM

## 2019-10-14 VITALS — DIASTOLIC BLOOD PRESSURE: 82 MMHG | SYSTOLIC BLOOD PRESSURE: 138 MMHG

## 2019-10-14 DIAGNOSIS — R59.1 GENERALIZED ENLARGED LYMPH NODES: ICD-10-CM

## 2019-10-14 DIAGNOSIS — Z90.89 ACQUIRED ABSENCE OF OTHER ORGANS: Chronic | ICD-10-CM

## 2019-10-14 DIAGNOSIS — J20.9 ACUTE BRONCHITIS, UNSPECIFIED: ICD-10-CM

## 2019-10-14 DIAGNOSIS — Z87.09 PERSONAL HISTORY OF OTHER DISEASES OF THE RESPIRATORY SYSTEM: ICD-10-CM

## 2019-10-14 DIAGNOSIS — Z98.89 OTHER SPECIFIED POSTPROCEDURAL STATES: Chronic | ICD-10-CM

## 2019-10-14 DIAGNOSIS — Z90.710 ACQUIRED ABSENCE OF BOTH CERVIX AND UTERUS: Chronic | ICD-10-CM

## 2019-10-14 PROCEDURE — 71046 X-RAY EXAM CHEST 2 VIEWS: CPT | Mod: 26

## 2019-10-14 PROCEDURE — 36415 COLL VENOUS BLD VENIPUNCTURE: CPT

## 2019-10-14 PROCEDURE — 99213 OFFICE O/P EST LOW 20 MIN: CPT | Mod: 25

## 2019-10-14 PROCEDURE — 71046 X-RAY EXAM CHEST 2 VIEWS: CPT

## 2019-10-14 NOTE — PHYSICAL EXAM
[No Acute Distress] : no acute distress [Well Nourished] : well nourished [Normal Sclera/Conjunctiva] : normal sclera/conjunctiva [Normal Outer Ear/Nose] : the outer ears and nose were normal in appearance [No JVD] : no jugular venous distention [No Lymphadenopathy] : no lymphadenopathy [Supple] : supple [Normal Rate] : normal rate  [Regular Rhythm] : with a regular rhythm [No Edema] : there was no peripheral edema [No Extremity Clubbing/Cyanosis] : no extremity clubbing/cyanosis [de-identified] : throat slight rayray  no nodees [de-identified] : elza rhonchi exp wheeze

## 2019-10-14 NOTE — REVIEW OF SYSTEMS
[Fatigue] : fatigue [Earache] : earache [Nasal Discharge] : nasal discharge [Sore Throat] : sore throat [Wheezing] : wheezing [Cough] : cough [Fever] : no fever [Chills] : no chills [Chest Pain] : no chest pain [Palpitations] : no palpitations [Shortness Of Breath] : no shortness of breath

## 2019-10-14 NOTE — HISTORY OF PRESENT ILLNESS
[de-identified] : Pt felt a little better in Sept then had cough and thick mucous and congestion return.  No fever

## 2019-10-15 LAB
ALBUMIN SERPL ELPH-MCNC: 4.2 G/DL
ALP BLD-CCNC: 73 U/L
ALT SERPL-CCNC: 11 U/L
ANION GAP SERPL CALC-SCNC: 14 MMOL/L
AST SERPL-CCNC: 16 U/L
BASOPHILS # BLD AUTO: 0.03 K/UL
BASOPHILS NFR BLD AUTO: 0.4 %
BILIRUB SERPL-MCNC: 0.4 MG/DL
BUN SERPL-MCNC: 10 MG/DL
CALCIUM SERPL-MCNC: 9.8 MG/DL
CHLORIDE SERPL-SCNC: 105 MMOL/L
CO2 SERPL-SCNC: 25 MMOL/L
CREAT SERPL-MCNC: 0.6 MG/DL
EOSINOPHIL # BLD AUTO: 0.17 K/UL
EOSINOPHIL NFR BLD AUTO: 2.5 %
GLUCOSE SERPL-MCNC: 79 MG/DL
HCT VFR BLD CALC: 47.3 %
HGB BLD-MCNC: 14.8 G/DL
IMM GRANULOCYTES NFR BLD AUTO: 0.7 %
LYMPHOCYTES # BLD AUTO: 1.77 K/UL
LYMPHOCYTES NFR BLD AUTO: 25.9 %
MAN DIFF?: NORMAL
MCHC RBC-ENTMCNC: 29.4 PG
MCHC RBC-ENTMCNC: 31.3 GM/DL
MCV RBC AUTO: 93.8 FL
MONOCYTES # BLD AUTO: 0.26 K/UL
MONOCYTES NFR BLD AUTO: 3.8 %
NEUTROPHILS # BLD AUTO: 4.55 K/UL
NEUTROPHILS NFR BLD AUTO: 66.7 %
PLATELET # BLD AUTO: 303 K/UL
POTASSIUM SERPL-SCNC: 5.1 MMOL/L
PROT SERPL-MCNC: 6.8 G/DL
RBC # BLD: 5.04 M/UL
RBC # FLD: 12.9 %
SODIUM SERPL-SCNC: 144 MMOL/L
WBC # FLD AUTO: 6.83 K/UL

## 2019-10-25 ENCOUNTER — APPOINTMENT (OUTPATIENT)
Dept: INTERNAL MEDICINE | Facility: CLINIC | Age: 69
End: 2019-10-25
Payer: MEDICARE

## 2019-10-25 VITALS — TEMPERATURE: 98.2 F | HEIGHT: 62 IN | OXYGEN SATURATION: 98 % | HEART RATE: 72 BPM

## 2019-10-25 VITALS — SYSTOLIC BLOOD PRESSURE: 134 MMHG | DIASTOLIC BLOOD PRESSURE: 82 MMHG

## 2019-10-25 PROCEDURE — 99213 OFFICE O/P EST LOW 20 MIN: CPT

## 2019-10-25 NOTE — HISTORY OF PRESENT ILLNESS
[de-identified] : Pt comes for recheck  Feels a little better but still has nasal congestion and voice loss occassionally

## 2019-10-25 NOTE — PHYSICAL EXAM
[No Acute Distress] : no acute distress [No Respiratory Distress] : no respiratory distress  [No JVD] : no jugular venous distention [No Accessory Muscle Use] : no accessory muscle use [Clear to Auscultation] : lungs were clear to auscultation bilaterally [Normal Rate] : normal rate  [Regular Rhythm] : with a regular rhythm [Normal S1, S2] : normal S1 and S2 [Non Tender] : non-tender [Soft] : abdomen soft [Non-distended] : non-distended

## 2019-10-25 NOTE — REVIEW OF SYSTEMS
[Hearing Loss] : hearing loss [Hoarseness] : hoarseness [Fever] : no fever [Chills] : no chills [Nasal Discharge] : no nasal discharge [Sore Throat] : no sore throat [Postnasal Drip] : no postnasal drip [Shortness Of Breath] : no shortness of breath [Wheezing] : no wheezing

## 2019-11-16 ENCOUNTER — APPOINTMENT (OUTPATIENT)
Dept: INTERNAL MEDICINE | Facility: CLINIC | Age: 69
End: 2019-11-16
Payer: MEDICARE

## 2019-11-16 VITALS
BODY MASS INDEX: 30.18 KG/M2 | HEART RATE: 75 BPM | WEIGHT: 164 LBS | OXYGEN SATURATION: 98 % | RESPIRATION RATE: 17 BRPM | HEIGHT: 62 IN

## 2019-11-16 VITALS — SYSTOLIC BLOOD PRESSURE: 132 MMHG | DIASTOLIC BLOOD PRESSURE: 82 MMHG

## 2019-11-16 PROCEDURE — 36415 COLL VENOUS BLD VENIPUNCTURE: CPT

## 2019-11-16 PROCEDURE — 99213 OFFICE O/P EST LOW 20 MIN: CPT | Mod: 25

## 2019-11-16 NOTE — PHYSICAL EXAM
[No Acute Distress] : no acute distress [Normal Sclera/Conjunctiva] : normal sclera/conjunctiva [Normal Outer Ear/Nose] : the outer ears and nose were normal in appearance [No Respiratory Distress] : no respiratory distress  [No JVD] : no jugular venous distention [Clear to Auscultation] : lungs were clear to auscultation bilaterally [No Accessory Muscle Use] : no accessory muscle use [Regular Rhythm] : with a regular rhythm [Normal S1, S2] : normal S1 and S2 [Normal Rate] : normal rate  [No Edema] : there was no peripheral edema [No Extremity Clubbing/Cyanosis] : no extremity clubbing/cyanosis

## 2019-11-16 NOTE — HISTORY OF PRESENT ILLNESS
[de-identified] : Pt comes for recheck  Stopped singulair after 2 weeks  Seeing Dr Cordova for ENT and voice.  C/o memory problems and dyspnea

## 2019-11-16 NOTE — REVIEW OF SYSTEMS
[Hoarseness] : hoarseness [Sore Throat] : sore throat [Nasal Discharge] : nasal discharge [Shortness Of Breath] : shortness of breath [Chest Pain] : no chest pain [Fever] : no fever [Palpitations] : no palpitations [Lower Ext Edema] : no lower extremity edema [Wheezing] : no wheezing [Cough] : no cough [Abdominal Pain] : no abdominal pain Referring Physician (Optional): NINO

## 2019-11-18 LAB
FOLATE SERPL-MCNC: 19.4 NG/ML
VIT B12 SERPL-MCNC: >2000 PG/ML

## 2019-11-19 LAB

## 2019-11-22 ENCOUNTER — APPOINTMENT (OUTPATIENT)
Dept: PULMONOLOGY | Facility: CLINIC | Age: 69
End: 2019-11-22
Payer: MEDICARE

## 2019-11-22 VITALS
HEART RATE: 83 BPM | WEIGHT: 164 LBS | SYSTOLIC BLOOD PRESSURE: 156 MMHG | BODY MASS INDEX: 30.18 KG/M2 | TEMPERATURE: 98.3 F | DIASTOLIC BLOOD PRESSURE: 82 MMHG | HEIGHT: 62 IN | OXYGEN SATURATION: 96 %

## 2019-11-22 DIAGNOSIS — R09.81 NASAL CONGESTION: ICD-10-CM

## 2019-11-22 PROCEDURE — 94729 DIFFUSING CAPACITY: CPT

## 2019-11-22 PROCEDURE — 94060 EVALUATION OF WHEEZING: CPT

## 2019-11-22 PROCEDURE — 99204 OFFICE O/P NEW MOD 45 MIN: CPT | Mod: 25

## 2019-11-22 PROCEDURE — 94727 GAS DIL/WSHOT DETER LNG VOL: CPT

## 2019-11-22 RX ORDER — PROMETHAZINE HYDROCHLORIDE 6.25 MG/5ML
6.25 SOLUTION ORAL
Qty: 180 | Refills: 1 | Status: DISCONTINUED | COMMUNITY
Start: 2019-09-18 | End: 2019-11-22

## 2019-11-22 RX ORDER — ELECTROLYTES/DEXTROSE
SOLUTION, ORAL ORAL
Refills: 0 | Status: DISCONTINUED | COMMUNITY
End: 2019-11-22

## 2019-11-22 RX ORDER — AZITHROMYCIN 250 MG/1
250 TABLET, FILM COATED ORAL
Qty: 1 | Refills: 0 | Status: DISCONTINUED | COMMUNITY
Start: 2019-09-18 | End: 2019-11-22

## 2019-11-22 RX ORDER — CEPHALEXIN 500 MG/1
500 TABLET ORAL 3 TIMES DAILY
Qty: 30 | Refills: 0 | Status: DISCONTINUED | COMMUNITY
Start: 2017-05-12 | End: 2019-11-22

## 2019-11-22 RX ORDER — CEFUROXIME AXETIL 250 MG/1
250 TABLET ORAL TWICE DAILY
Qty: 20 | Refills: 0 | Status: DISCONTINUED | COMMUNITY
Start: 2019-10-14 | End: 2019-11-22

## 2019-11-22 RX ORDER — PREDNISONE 10 MG/1
10 TABLET ORAL
Qty: 18 | Refills: 0 | Status: DISCONTINUED | COMMUNITY
Start: 2019-10-14 | End: 2019-11-22

## 2019-11-22 RX ORDER — AMOXICILLIN AND CLAVULANATE POTASSIUM 875; 125 MG/1; MG/1
875-125 TABLET, COATED ORAL
Qty: 20 | Refills: 0 | Status: DISCONTINUED | COMMUNITY
Start: 2017-04-23 | End: 2019-11-22

## 2019-11-22 RX ORDER — BUDESONIDE AND FORMOTEROL FUMARATE DIHYDRATE 160; 4.5 UG/1; UG/1
160-4.5 AEROSOL RESPIRATORY (INHALATION) TWICE DAILY
Qty: 1 | Refills: 1 | Status: DISCONTINUED | COMMUNITY
Start: 2019-10-14 | End: 2019-11-22

## 2019-11-22 RX ORDER — TRIAMCINOLONE 4 MG
TABLET, DOSE PACK ORAL
Refills: 0 | Status: DISCONTINUED | COMMUNITY
End: 2019-11-22

## 2019-11-22 RX ORDER — CEFADROXIL 500 MG/1
500 CAPSULE ORAL
Qty: 20 | Refills: 0 | Status: DISCONTINUED | COMMUNITY
Start: 2017-03-17 | End: 2019-11-22

## 2019-11-22 RX ORDER — CEPHALEXIN 500 MG/1
500 TABLET ORAL
Qty: 30 | Refills: 0 | Status: DISCONTINUED | COMMUNITY
Start: 2017-06-08 | End: 2019-11-22

## 2019-11-22 RX ORDER — GINKGO BILOBA 60 MG
TABLET ORAL
Refills: 0 | Status: DISCONTINUED | COMMUNITY
End: 2019-11-22

## 2019-11-22 RX ORDER — PREDNISONE 10 MG/1
10 TABLET ORAL
Qty: 10 | Refills: 0 | Status: DISCONTINUED | COMMUNITY
Start: 2016-12-31 | End: 2019-11-22

## 2019-11-22 RX ORDER — ASCORBIC ACID 125 MG
TABLET,CHEWABLE ORAL
Refills: 0 | Status: DISCONTINUED | COMMUNITY
End: 2019-11-22

## 2019-11-22 RX ORDER — PREDNISONE 50 MG/1
50 TABLET ORAL
Qty: 4 | Refills: 0 | Status: DISCONTINUED | COMMUNITY
Start: 2016-12-27 | End: 2019-11-22

## 2019-11-22 RX ORDER — PREDNISONE 10 MG/1
10 TABLET ORAL
Qty: 18 | Refills: 0 | Status: DISCONTINUED | COMMUNITY
Start: 2019-09-18 | End: 2019-11-22

## 2019-11-22 NOTE — PHYSICAL EXAM
[General Appearance - Well Developed] : well developed [General Appearance - Well Nourished] : well nourished [Normal Conjunctiva] : the conjunctiva exhibited no abnormalities [II] : II [Neck Appearance] : the appearance of the neck was normal [Neck Cervical Mass (___cm)] : no neck mass was observed [Heart Rate And Rhythm] : heart rate and rhythm were normal [Heart Sounds] : normal S1 and S2 [Arterial Pulses Normal] : the arterial pulses were normal [Respiration, Rhythm And Depth] : normal respiratory rhythm and effort [Exaggerated Use Of Accessory Muscles For Inspiration] : no accessory muscle use [Auscultation Breath Sounds / Voice Sounds] : lungs were clear to auscultation bilaterally [Abdomen Soft] : soft [Abdomen Tenderness] : non-tender [Abdomen Mass (___ Cm)] : no abdominal mass palpated [Abnormal Walk] : normal gait [Nail Clubbing] : no clubbing of the fingernails [Cyanosis, Localized] : no localized cyanosis [Skin Color & Pigmentation] : normal skin color and pigmentation [Skin Turgor] : normal skin turgor [] : no rash [No Focal Deficits] : no focal deficits [Oriented To Time, Place, And Person] : oriented to person, place, and time [Impaired Insight] : insight and judgment were intact [FreeTextEntry1] : soft systolic murmur [FreeTextEntry2] : trace bilateral KARLA

## 2019-11-22 NOTE — REVIEW OF SYSTEMS
[Nasal Congestion] : nasal congestion [Postnasal Drip] : postnasal drip [Sinus Problems] : sinus problems [As Noted in HPI] : as noted in HPI [Murmurs] : a ~P ~M murmur was heard [Edema] : ~T edema was present [Memory Loss] : ~T memory lapses or loss [Negative] : Endocrine [Dysrhythmia] : no dysrhythmia [Palpitations] : no palpitations

## 2019-11-22 NOTE — HISTORY OF PRESENT ILLNESS
[FreeTextEntry1] : Ms. JENSEN ARELLANO is here for evaluation of SOB. \par \par She is a recently retired ICU nurse. When she was working, had some CARROLL with climbing stairs. Now, notices it more. SOB when vacuuming or climbing stairs in her house. Denies wheezing, chest pain, palpitations. \par In early September, she developed cough and sore throat. Was seen at Urgent Care - treated with Flonase/PO steroids/Zpack. Subsequently, seen by Dr Rajput - given Cefuoxime, Symbicort and Singulair. She still notes coarse voice and sinus congestion. Cough almost fully resolved. \par She has ENT appt scheduled on Monday\par \par \par PMH: TIAs, MV prolapse, provoked DVT in 1990s. 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  Quit smoked when she was 28 yo, smoked 1 ppd x 3 years. \par FH: both parents and brother have emphysema -all smokers. \par PMD: KOSTAS RAJPUT

## 2019-11-22 NOTE — ASSESSMENT
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 \par #Cough and hoarse voice - postviral vs 2/2 postnasal gtt\par -- Flonase 2 puffs bid - instructed on proper technique\par -- has appt with ENT scheduled for next week\par #SOB - PFTs today - normal spirometry, normal lung volumes\par -- I am concerned for underlying cardiac etiology of her dyspnea, especially given murmur, LE edema and hx of mitral prolapse. Patient already has appt with cardiologist scheduled. Will obtain TTE\par #Memory issues - recent B12/folate wnl\par -- has neurology appt and MRI scheduled\par \par f/u in 4 weeks after TTE or sooner if needed. \par all questions answered. patient agrees with plan

## 2019-11-25 ENCOUNTER — APPOINTMENT (OUTPATIENT)
Dept: OTOLARYNGOLOGY | Facility: CLINIC | Age: 69
End: 2019-11-25
Payer: MEDICARE

## 2019-11-25 VITALS
SYSTOLIC BLOOD PRESSURE: 147 MMHG | DIASTOLIC BLOOD PRESSURE: 87 MMHG | HEART RATE: 74 BPM | BODY MASS INDEX: 30.18 KG/M2 | HEIGHT: 62 IN | WEIGHT: 164 LBS

## 2019-11-25 DIAGNOSIS — R49.0 DYSPHONIA: ICD-10-CM

## 2019-11-25 PROCEDURE — 99214 OFFICE O/P EST MOD 30 MIN: CPT | Mod: 25

## 2019-11-25 PROCEDURE — 31575 DIAGNOSTIC LARYNGOSCOPY: CPT

## 2019-11-25 NOTE — HISTORY OF PRESENT ILLNESS
[de-identified] : patient is here today with voice changes that come and go. SHe does have a history of a cough on and off for a fewmonths but even after that improved she has raspy voice and occasionally loses her voice completely. She does not have any throat pain or issues swallowing and is not having any issues eating or drinking. The voice cahnges are rare but when they happen she is unable to do all the normal talking she needs to do daily

## 2019-11-25 NOTE — PHYSICAL EXAM
[Midline] : trachea located in midline position [Removed] : palatine tonsils previously removed [Normal] : no rashes [de-identified] : pain in the area of the left sternocleidomastoid muscle upon palpation

## 2019-11-25 NOTE — ASSESSMENT
[FreeTextEntry1] : Patient recent bronchitis a lot of coughing seems to have got a raspy voice was referred by her primary to rule out other etiologies to a change in her brace fiberoptic evaluation of the larynx reveal normal vocal cord anatomy and function no tumors or masses patient was reassured her off exteriorized and she does not think she will voice will return to baseline normal in the near future steroids will expedite that. She understands and is willing to be patient and I agree.

## 2019-12-23 ENCOUNTER — APPOINTMENT (OUTPATIENT)
Dept: PULMONOLOGY | Facility: CLINIC | Age: 69
End: 2019-12-23

## 2019-12-30 ENCOUNTER — APPOINTMENT (OUTPATIENT)
Dept: INTERNAL MEDICINE | Facility: CLINIC | Age: 69
End: 2019-12-30

## 2020-01-13 ENCOUNTER — APPOINTMENT (OUTPATIENT)
Dept: INTERNAL MEDICINE | Facility: CLINIC | Age: 70
End: 2020-01-13

## 2020-07-21 ENCOUNTER — OUTPATIENT (OUTPATIENT)
Dept: OUTPATIENT SERVICES | Facility: HOSPITAL | Age: 70
LOS: 1 days | End: 2020-07-21
Payer: MEDICARE

## 2020-07-21 ENCOUNTER — APPOINTMENT (OUTPATIENT)
Dept: MAMMOGRAPHY | Facility: CLINIC | Age: 70
End: 2020-07-21
Payer: MEDICARE

## 2020-07-21 ENCOUNTER — APPOINTMENT (OUTPATIENT)
Dept: ULTRASOUND IMAGING | Facility: CLINIC | Age: 70
End: 2020-07-21
Payer: MEDICARE

## 2020-07-21 ENCOUNTER — APPOINTMENT (OUTPATIENT)
Dept: RADIOLOGY | Facility: CLINIC | Age: 70
End: 2020-07-21
Payer: MEDICARE

## 2020-07-21 DIAGNOSIS — Z00.8 ENCOUNTER FOR OTHER GENERAL EXAMINATION: ICD-10-CM

## 2020-07-21 DIAGNOSIS — Z90.89 ACQUIRED ABSENCE OF OTHER ORGANS: Chronic | ICD-10-CM

## 2020-07-21 DIAGNOSIS — Z98.89 OTHER SPECIFIED POSTPROCEDURAL STATES: Chronic | ICD-10-CM

## 2020-07-21 DIAGNOSIS — Z90.710 ACQUIRED ABSENCE OF BOTH CERVIX AND UTERUS: Chronic | ICD-10-CM

## 2020-07-21 PROCEDURE — 77063 BREAST TOMOSYNTHESIS BI: CPT

## 2020-07-21 PROCEDURE — 76641 ULTRASOUND BREAST COMPLETE: CPT | Mod: 26,50

## 2020-07-21 PROCEDURE — 77067 SCR MAMMO BI INCL CAD: CPT | Mod: 26

## 2020-07-21 PROCEDURE — 77067 SCR MAMMO BI INCL CAD: CPT

## 2020-07-21 PROCEDURE — 77080 DXA BONE DENSITY AXIAL: CPT | Mod: 26

## 2020-07-21 PROCEDURE — 76641 ULTRASOUND BREAST COMPLETE: CPT

## 2020-07-21 PROCEDURE — 77080 DXA BONE DENSITY AXIAL: CPT

## 2020-07-21 PROCEDURE — 77063 BREAST TOMOSYNTHESIS BI: CPT | Mod: 26

## 2020-08-07 ENCOUNTER — APPOINTMENT (OUTPATIENT)
Dept: INTERNAL MEDICINE | Facility: CLINIC | Age: 70
End: 2020-08-07
Payer: MEDICARE

## 2020-08-07 VITALS
OXYGEN SATURATION: 97 % | HEIGHT: 62 IN | RESPIRATION RATE: 16 BRPM | HEART RATE: 76 BPM | WEIGHT: 158 LBS | BODY MASS INDEX: 29.08 KG/M2

## 2020-08-07 PROCEDURE — 99213 OFFICE O/P EST LOW 20 MIN: CPT

## 2020-08-07 NOTE — PHYSICAL EXAM
[No Acute Distress] : no acute distress [de-identified] : right hand multiple sight of hand and fingers susggestive of OA   minor base of thumb tenderness

## 2020-08-07 NOTE — REVIEW OF SYSTEMS
[Joint Pain] : joint pain [Joint Stiffness] : joint stiffness [Joint Swelling] : joint swelling [Chills] : no chills [Fever] : no fever [Palpitations] : no palpitations [Chest Pain] : no chest pain [Lower Ext Edema] : no lower extremity edema [Shortness Of Breath] : no shortness of breath [Wheezing] : no wheezing

## 2020-08-07 NOTE — HISTORY OF PRESENT ILLNESS
[de-identified] : Pt comes for right hand swelling of joints after starting alendronate.  Pt had hx of severe reaction to reclast in past

## 2020-08-19 ENCOUNTER — APPOINTMENT (OUTPATIENT)
Dept: ORTHOPEDIC SURGERY | Facility: CLINIC | Age: 70
End: 2020-08-19
Payer: MEDICARE

## 2020-08-19 VITALS
SYSTOLIC BLOOD PRESSURE: 123 MMHG | HEIGHT: 63 IN | TEMPERATURE: 98.1 F | BODY MASS INDEX: 29.06 KG/M2 | DIASTOLIC BLOOD PRESSURE: 77 MMHG | HEART RATE: 66 BPM | WEIGHT: 164 LBS

## 2020-08-19 DIAGNOSIS — Z87.39 PERSONAL HISTORY OF OTHER DISEASES OF THE MUSCULOSKELETAL SYSTEM AND CONNECTIVE TISSUE: ICD-10-CM

## 2020-08-19 DIAGNOSIS — Z80.9 FAMILY HISTORY OF MALIGNANT NEOPLASM, UNSPECIFIED: ICD-10-CM

## 2020-08-19 DIAGNOSIS — M77.9 ENTHESOPATHY, UNSPECIFIED: ICD-10-CM

## 2020-08-19 DIAGNOSIS — Z78.9 OTHER SPECIFIED HEALTH STATUS: ICD-10-CM

## 2020-08-19 PROCEDURE — 99203 OFFICE O/P NEW LOW 30 MIN: CPT

## 2020-08-19 PROCEDURE — 73130 X-RAY EXAM OF HAND: CPT | Mod: RT

## 2020-08-19 RX ORDER — ASPIRIN 325 MG/1
TABLET, FILM COATED ORAL
Refills: 0 | Status: DISCONTINUED | COMMUNITY
End: 2020-08-19

## 2020-08-19 RX ORDER — NYSTATIN AND TRIAMCINOLONE ACETONIDE 100000; 1 MG/G; MG/G
100000-0.1 CREAM TOPICAL
Qty: 30 | Refills: 0 | Status: DISCONTINUED | COMMUNITY
Start: 2016-10-29 | End: 2020-08-19

## 2020-08-19 NOTE — PHYSICAL EXAM
[de-identified] : - Constitutional: This is a female in no obvious distress.  \par - Psych: Patient is alert and oriented to person, place and time.  Patient has a normal mood and affect.\par - Cardiovascular: Normal pulses throughout the upper extremities.  No significant varicosities are noted in the upper extremities. \par - Neuro: Strength and sensation are intact throughout the upper extremities.  Patient has normal coordination.\par - Respiratory:  Patient exhibits no evidence of shortness of breath or difficulty breathing.\par - Skin: No rashes, lesions, or other abnormalities are noted in the upper extremities.\par \par ---\par  \par Examination of her right hand demonstrates possibly mild swelling along the dorsal radial aspect of the hand in the region of the index CMC joint.  There is no localized tenderness.  There is no evidence of trigger finger.  There are no localized areas of tenderness.  Specifically, there is no swelling or tenderness along the CMC joint of the thumb.  She has full flexion and extension of the digits.  She is neurovascularly intact distally. 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

## 2020-08-19 NOTE — DISCUSSION/SUMMARY
[FreeTextEntry1] : She has findings consistent with complaints of right hand pain and swelling of unclear etiology.  Her examination today is within normal limits and her radiographs only demonstrate minimal CMC joint arthritis of the thumb.  This may be secondary to tendinitis.\par \par I had a discussion regarding today's visit, the diagnosis, and treatment recommendations / options. At this time, I recommended she begin a 2-week course of Mobic once daily with meals. I warned about potential GI side affects. We will consider proceeding with an MRI if her symptoms do not improve. \par \par The patient has agreed to this plan of management and has expressed full understanding.  All questions were fully answered to the patient's satisfaction.\par \par Over 50% of the time spent with the patient was on counseling the patient on the above diagnosis, treatment plan and prognosis.

## 2020-08-19 NOTE — ADDENDUM
[FreeTextEntry1] : I, Yanelis Flores, acted solely as a scribe for Dr. Lance on this date 08/19/2020.

## 2020-08-19 NOTE — CONSULT LETTER
[Dear  ___] : Dear  [unfilled], [Please see my note below.] : Please see my note below. [Consult Letter:] : I had the pleasure of evaluating your patient, [unfilled]. [Consult Closing:] : Thank you very much for allowing me to participate in the care of this patient.  If you have any questions, please do not hesitate to contact me. [Sincerely,] : Sincerely, [FreeTextEntry3] : Addison Lance M.D.\par Surgery of the Hand & Upper Extremity\par Orthopaedic Surgery\par Chief, Hand Service, [facility]\par Director, Hand Service, [facility]\par  of Orthopedic Surgery, [facility] [employer] [facility] \par [facility]Email: [email or web address] Office Phone: [phone]

## 2020-08-19 NOTE — END OF VISIT
[FreeTextEntry3] : This note was written by Yanelis Flores on 08/19/2020 acting solely as a scribe for Dr. Addison Lance.\par  \par All medical record entries made by the Scribe were at my, Dr. Addison Lance, direction and personally dictated by me on 08/19/2020. I have personally reviewed the chart and agree that the record accurately reflects my personal performance of the history, physical exam, assessment and plan.
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
## 2020-08-31 NOTE — ED ADULT NURSE NOTE - NS PRO PASSIVE SMOKE EXP
Take azithromycin and prednisone as ordered.  If this has not worked or symptoms seem worse, contact us again.  Visit may be required.  Use nebulizer as desired  Continue other treatments for other health conditions as you are.  
No

## 2020-09-24 ENCOUNTER — APPOINTMENT (OUTPATIENT)
Dept: INTERNAL MEDICINE | Facility: CLINIC | Age: 70
End: 2020-09-24
Payer: MEDICARE

## 2020-09-24 PROCEDURE — 90471 IMMUNIZATION ADMIN: CPT

## 2020-09-24 PROCEDURE — 90662 IIV NO PRSV INCREASED AG IM: CPT

## 2020-09-24 PROCEDURE — G0008: CPT

## 2020-12-14 ENCOUNTER — TRANSCRIPTION ENCOUNTER (OUTPATIENT)
Age: 70
End: 2020-12-14

## 2020-12-21 PROBLEM — Z87.09 HISTORY OF ACUTE BRONCHITIS: Status: RESOLVED | Noted: 2019-09-18 | Resolved: 2020-12-21

## 2021-04-07 ENCOUNTER — APPOINTMENT (OUTPATIENT)
Dept: INTERNAL MEDICINE | Facility: CLINIC | Age: 71
End: 2021-04-07
Payer: MEDICARE

## 2021-04-07 VITALS
WEIGHT: 151 LBS | HEART RATE: 71 BPM | BODY MASS INDEX: 26.75 KG/M2 | TEMPERATURE: 98 F | RESPIRATION RATE: 16 BRPM | OXYGEN SATURATION: 98 % | HEIGHT: 63 IN

## 2021-04-07 VITALS — SYSTOLIC BLOOD PRESSURE: 122 MMHG | DIASTOLIC BLOOD PRESSURE: 72 MMHG

## 2021-04-07 DIAGNOSIS — R10.9 UNSPECIFIED ABDOMINAL PAIN: ICD-10-CM

## 2021-04-07 PROCEDURE — 36415 COLL VENOUS BLD VENIPUNCTURE: CPT

## 2021-04-07 PROCEDURE — 99213 OFFICE O/P EST LOW 20 MIN: CPT | Mod: 25

## 2021-04-07 PROCEDURE — G0439: CPT

## 2021-04-07 NOTE — PHYSICAL EXAM
[No Acute Distress] : no acute distress [Normal Sclera/Conjunctiva] : normal sclera/conjunctiva [Normal Outer Ear/Nose] : the outer ears and nose were normal in appearance [No JVD] : no jugular venous distention [No Respiratory Distress] : no respiratory distress  [No Accessory Muscle Use] : no accessory muscle use [Clear to Auscultation] : lungs were clear to auscultation bilaterally [Normal Rate] : normal rate  [Regular Rhythm] : with a regular rhythm [No Edema] : there was no peripheral edema [No Extremity Clubbing/Cyanosis] : no extremity clubbing/cyanosis [Soft] : abdomen soft [Non Tender] : non-tender [Non-distended] : non-distended [de-identified] : no specific tenderness elicited

## 2021-04-07 NOTE — PLAN
[FreeTextEntry1] : CT abd pelvis with contrast  \par DIscussed health care proxy and diet\par labs ordered

## 2021-04-07 NOTE — HISTORY OF PRESENT ILLNESS
[de-identified] : Pt comes for 2 weeks of LLQ abd pain with change in BM  Tried to change diet  Has hx of lactose intolerance and only uses lactaid free products  Pt aslo for annual medicare well visit.  Pt sees ortho Dr Lance and will be seeing GI Dr Brennan  Had seen Dr Elaine paul in past

## 2021-04-07 NOTE — HEALTH RISK ASSESSMENT
[Never (0 pts)] : Never (0 points) [No] : In the past 12 months have you used drugs other than those required for medical reasons? No [No falls in past year] : Patient reported no falls in the past year [0] : 2) Feeling down, depressed, or hopeless: Not at all (0) [None] : None [With Family] : lives with family [Retired] : retired [] :  [Feels Safe at Home] : Feels safe at home [Fully functional (bathing, dressing, toileting, transferring, walking, feeding)] : Fully functional (bathing, dressing, toileting, transferring, walking, feeding) [Fully functional (using the telephone, shopping, preparing meals, housekeeping, doing laundry, using] : Fully functional and needs no help or supervision to perform IADLs (using the telephone, shopping, preparing meals, housekeeping, doing laundry, using transportation, managing medications and managing finances) [Reports normal functional visual acuity (ie: able to read med bottle)] : Reports normal functional visual acuity [With Patient/Caregiver] : With Patient/Caregiver [Designated Healthcare Proxy] : Designated healthcare proxy [Name: ___] : Health Care Proxy's Name: [unfilled]  [Relationship: ___] : Relationship: [unfilled] [I will adhere to the patient's wishes as expressed in the advance directive except as noted below.] : I will adhere to the patient's wishes as expressed in the advance directive except as noted below [] : No [Audit-CScore] : 0 [[id number]] : 0 [Change in mental status noted] : No change in mental status noted [Language] : denies difficulty with language [Behavior] : denies difficulty with behavior [Learning/Retaining New Information] : denies difficulty learning/retaining new information [Handling Complex Tasks] : denies difficulty handling complex tasks [Reasoning] : denies difficulty with reasoning [Spatial Ability and Orientation] : denies difficulty with spatial ability and orientation [Reports changes in hearing] : Reports no changes in hearing [Reports changes in vision] : Reports no changes in vision [Reports changes in dental health] : Reports no changes in dental health [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

## 2021-04-07 NOTE — REVIEW OF SYSTEMS
[Abdominal Pain] : abdominal pain [Constipation] : constipation [Fever] : no fever [Chills] : no chills [Chest Pain] : no chest pain [Palpitations] : no palpitations [Shortness Of Breath] : no shortness of breath [Wheezing] : no wheezing [Nausea] : no nausea [Vomiting] : no vomiting [Melena] : no melena

## 2021-04-08 ENCOUNTER — APPOINTMENT (OUTPATIENT)
Dept: GASTROENTEROLOGY | Facility: CLINIC | Age: 71
End: 2021-04-08
Payer: MEDICARE

## 2021-04-08 VITALS
DIASTOLIC BLOOD PRESSURE: 78 MMHG | WEIGHT: 151 LBS | SYSTOLIC BLOOD PRESSURE: 121 MMHG | HEART RATE: 67 BPM | OXYGEN SATURATION: 97 % | HEIGHT: 63 IN | BODY MASS INDEX: 26.75 KG/M2

## 2021-04-08 PROCEDURE — 99204 OFFICE O/P NEW MOD 45 MIN: CPT

## 2021-04-08 RX ORDER — FLUTICASONE PROPIONATE 50 UG/1
50 SPRAY, METERED NASAL TWICE DAILY
Qty: 1 | Refills: 0 | Status: DISCONTINUED | COMMUNITY
Start: 2019-11-22 | End: 2021-04-08

## 2021-04-08 RX ORDER — MONTELUKAST 10 MG/1
10 TABLET, FILM COATED ORAL
Qty: 30 | Refills: 3 | Status: DISCONTINUED | COMMUNITY
Start: 2019-10-25 | End: 2021-04-08

## 2021-04-08 RX ORDER — MELOXICAM 7.5 MG/1
7.5 TABLET ORAL
Qty: 20 | Refills: 1 | Status: DISCONTINUED | COMMUNITY
Start: 2020-08-19 | End: 2021-04-08

## 2021-04-08 NOTE — HISTORY OF PRESENT ILLNESS
[de-identified] : The patient is here to discuss surveillance colonoscopy.  She has a history of adenomas and is due for study.  She also reports that she recently had a rectal impaction after eating sharp seeds and a cheese snack.  This was associated with abdominal pain.  A CT enterography has been ordered and is pending.  She has had intentional weight loss.  It took her 3 days to start moving her bowels on a regular basis.  She has chronic constipation with intermittent firm stool.  She denies any rectal bleeding.   She exercises on a regular basis and eats a fairly healthy diet.  She denies heartburn, odynophagia, dysphagia or satiety.

## 2021-04-08 NOTE — PHYSICAL EXAM
[General Appearance - Alert] : alert [General Appearance - In No Acute Distress] : in no acute distress [Sclera] : the sclera and conjunctiva were normal [PERRL With Normal Accommodation] : pupils were equal in size, round, and reactive to light [Extraocular Movements] : extraocular movements were intact [Outer Ear] : the ears and nose were normal in appearance [Oropharynx] : the oropharynx was normal [Neck Appearance] : the appearance of the neck was normal [Neck Cervical Mass (___cm)] : no neck mass was observed [Jugular Venous Distention Increased] : there was no jugular-venous distention [Thyroid Diffuse Enlargement] : the thyroid was not enlarged [Thyroid Nodule] : there were no palpable thyroid nodules [Auscultation Breath Sounds / Voice Sounds] : lungs were clear to auscultation bilaterally [Heart Rate And Rhythm] : heart rate was normal and rhythm regular [Heart Sounds] : normal S1 and S2 [Heart Sounds Gallop] : no gallops [Murmurs] : no murmurs [Heart Sounds Pericardial Friction Rub] : no pericardial rub [Edema] : there was no peripheral edema [Cervical Lymph Nodes Enlarged Posterior Bilaterally] : posterior cervical [Cervical Lymph Nodes Enlarged Anterior Bilaterally] : anterior cervical [No CVA Tenderness] : no ~M costovertebral angle tenderness [No Spinal Tenderness] : no spinal tenderness [Abnormal Walk] : normal gait [Nail Clubbing] : no clubbing  or cyanosis of the fingernails [Musculoskeletal - Swelling] : no joint swelling seen [Skin Color & Pigmentation] : normal skin color and pigmentation [Motor Tone] : muscle strength and tone were normal [No Focal Deficits] : no focal deficits [Impaired Insight] : insight and judgment were intact [Oriented To Time, Place, And Person] : oriented to person, place, and time [Affect] : the affect was normal [Bowel Sounds] : normal bowel sounds [Abdomen Soft] : soft [Abdomen Tenderness] : non-tender [] : no hepato-splenomegaly [Abdomen Mass (___ Cm)] : no abdominal mass palpated

## 2021-04-08 NOTE — ASSESSMENT
[FreeTextEntry1] : My impression is that of a woman who struggles with intermittent constipation who has a prior history of an adenoma.\par \par I have spent a great deal of time discussing the role of daily high-intensity exercise with the patient. We discussed behavior modification strategies to institute this habit.   I have discussed nutrition in great detail including consuming vegetable fibers on a daily basis and limiting simple carbohydrates 5 days per week.  We have also reviewed the benefits of soluble fiber supplementation, including (but not limited to), favorable effects on lipid profile, weight control and the salutary effects on colonic microbiota. We reviewed the effects of such daily habits on metabolism and the metabolic set point resulting in a healthy weight, decreased pain sensitivity (effecting the GI tract), bowel habits and other aspects of health.\par \par I have asked the patient to schedule a colonoscopy in the near future. I have reviewed the risks benefits and alternatives and provided the patient literature to read.  I have emphasized the need to have a good clean out including adequate fluid intake and avoiding seeds for one week prior to the procedure.

## 2021-04-09 ENCOUNTER — APPOINTMENT (OUTPATIENT)
Dept: CT IMAGING | Facility: CLINIC | Age: 71
End: 2021-04-09
Payer: MEDICARE

## 2021-04-09 ENCOUNTER — OUTPATIENT (OUTPATIENT)
Dept: OUTPATIENT SERVICES | Facility: HOSPITAL | Age: 71
LOS: 1 days | End: 2021-04-09
Payer: MEDICARE

## 2021-04-09 DIAGNOSIS — Z98.89 OTHER SPECIFIED POSTPROCEDURAL STATES: Chronic | ICD-10-CM

## 2021-04-09 DIAGNOSIS — Z90.89 ACQUIRED ABSENCE OF OTHER ORGANS: Chronic | ICD-10-CM

## 2021-04-09 DIAGNOSIS — R10.9 UNSPECIFIED ABDOMINAL PAIN: ICD-10-CM

## 2021-04-09 DIAGNOSIS — Z90.710 ACQUIRED ABSENCE OF BOTH CERVIX AND UTERUS: Chronic | ICD-10-CM

## 2021-04-09 LAB
ALBUMIN SERPL ELPH-MCNC: 4.4 G/DL
ALP BLD-CCNC: 70 U/L
ALT SERPL-CCNC: 8 U/L
ANION GAP SERPL CALC-SCNC: 10 MMOL/L
AST SERPL-CCNC: 12 U/L
BASOPHILS # BLD AUTO: 0.05 K/UL
BASOPHILS NFR BLD AUTO: 0.8 %
BILIRUB SERPL-MCNC: 0.5 MG/DL
BUN SERPL-MCNC: 14 MG/DL
CALCIUM SERPL-MCNC: 9.7 MG/DL
CHLORIDE SERPL-SCNC: 104 MMOL/L
CO2 SERPL-SCNC: 27 MMOL/L
CREAT SERPL-MCNC: 0.66 MG/DL
EOSINOPHIL # BLD AUTO: 0.23 K/UL
EOSINOPHIL NFR BLD AUTO: 3.7 %
GLUCOSE SERPL-MCNC: 74 MG/DL
HCT VFR BLD CALC: 46.7 %
HGB BLD-MCNC: 14.8 G/DL
IMM GRANULOCYTES NFR BLD AUTO: 0.2 %
LYMPHOCYTES # BLD AUTO: 1.75 K/UL
LYMPHOCYTES NFR BLD AUTO: 28.2 %
MAN DIFF?: NORMAL
MCHC RBC-ENTMCNC: 30.6 PG
MCHC RBC-ENTMCNC: 31.7 GM/DL
MCV RBC AUTO: 96.5 FL
MONOCYTES # BLD AUTO: 0.34 K/UL
MONOCYTES NFR BLD AUTO: 5.5 %
NEUTROPHILS # BLD AUTO: 3.82 K/UL
NEUTROPHILS NFR BLD AUTO: 61.6 %
PLATELET # BLD AUTO: 244 K/UL
POTASSIUM SERPL-SCNC: 4.8 MMOL/L
PROT SERPL-MCNC: 6.8 G/DL
RBC # BLD: 4.84 M/UL
RBC # FLD: 13.6 %
SODIUM SERPL-SCNC: 142 MMOL/L
TSH SERPL-ACNC: 1.86 UIU/ML
WBC # FLD AUTO: 6.2 K/UL

## 2021-04-09 PROCEDURE — 74177 CT ABD & PELVIS W/CONTRAST: CPT

## 2021-04-09 PROCEDURE — G1004: CPT

## 2021-04-09 PROCEDURE — 74177 CT ABD & PELVIS W/CONTRAST: CPT | Mod: 26,ME

## 2021-04-23 ENCOUNTER — APPOINTMENT (OUTPATIENT)
Dept: DISASTER EMERGENCY | Facility: CLINIC | Age: 71
End: 2021-04-23

## 2021-04-23 DIAGNOSIS — Z01.818 ENCOUNTER FOR OTHER PREPROCEDURAL EXAMINATION: ICD-10-CM

## 2021-04-24 LAB — SARS-COV-2 N GENE NPH QL NAA+PROBE: NOT DETECTED

## 2021-04-25 ENCOUNTER — TRANSCRIPTION ENCOUNTER (OUTPATIENT)
Age: 71
End: 2021-04-25

## 2021-04-26 ENCOUNTER — APPOINTMENT (OUTPATIENT)
Dept: GASTROENTEROLOGY | Facility: HOSPITAL | Age: 71
End: 2021-04-26

## 2021-04-26 ENCOUNTER — OUTPATIENT (OUTPATIENT)
Dept: OUTPATIENT SERVICES | Facility: HOSPITAL | Age: 71
LOS: 1 days | End: 2021-04-26
Payer: MEDICARE

## 2021-04-26 DIAGNOSIS — Z90.710 ACQUIRED ABSENCE OF BOTH CERVIX AND UTERUS: Chronic | ICD-10-CM

## 2021-04-26 DIAGNOSIS — Z98.89 OTHER SPECIFIED POSTPROCEDURAL STATES: Chronic | ICD-10-CM

## 2021-04-26 DIAGNOSIS — Z90.89 ACQUIRED ABSENCE OF OTHER ORGANS: Chronic | ICD-10-CM

## 2021-04-26 DIAGNOSIS — Z86.010 PERSONAL HISTORY OF COLONIC POLYPS: ICD-10-CM

## 2021-04-26 PROCEDURE — 45378 DIAGNOSTIC COLONOSCOPY: CPT

## 2021-08-30 ENCOUNTER — APPOINTMENT (OUTPATIENT)
Dept: ULTRASOUND IMAGING | Facility: CLINIC | Age: 71
End: 2021-08-30
Payer: MEDICARE

## 2021-08-30 ENCOUNTER — APPOINTMENT (OUTPATIENT)
Dept: MAMMOGRAPHY | Facility: CLINIC | Age: 71
End: 2021-08-30
Payer: MEDICARE

## 2021-08-30 ENCOUNTER — OUTPATIENT (OUTPATIENT)
Dept: OUTPATIENT SERVICES | Facility: HOSPITAL | Age: 71
LOS: 1 days | End: 2021-08-30
Payer: MEDICARE

## 2021-08-30 DIAGNOSIS — Z98.89 OTHER SPECIFIED POSTPROCEDURAL STATES: Chronic | ICD-10-CM

## 2021-08-30 DIAGNOSIS — Z00.8 ENCOUNTER FOR OTHER GENERAL EXAMINATION: ICD-10-CM

## 2021-08-30 DIAGNOSIS — Z90.710 ACQUIRED ABSENCE OF BOTH CERVIX AND UTERUS: Chronic | ICD-10-CM

## 2021-08-30 DIAGNOSIS — Z90.89 ACQUIRED ABSENCE OF OTHER ORGANS: Chronic | ICD-10-CM

## 2021-08-30 PROCEDURE — 77063 BREAST TOMOSYNTHESIS BI: CPT

## 2021-08-30 PROCEDURE — 77067 SCR MAMMO BI INCL CAD: CPT | Mod: 26

## 2021-08-30 PROCEDURE — 76641 ULTRASOUND BREAST COMPLETE: CPT | Mod: 26,50

## 2021-08-30 PROCEDURE — 76641 ULTRASOUND BREAST COMPLETE: CPT

## 2021-08-30 PROCEDURE — 77063 BREAST TOMOSYNTHESIS BI: CPT | Mod: 26

## 2021-08-30 PROCEDURE — 77067 SCR MAMMO BI INCL CAD: CPT

## 2021-09-22 ENCOUNTER — APPOINTMENT (OUTPATIENT)
Dept: INTERNAL MEDICINE | Facility: CLINIC | Age: 71
End: 2021-09-22
Payer: MEDICARE

## 2021-09-22 PROCEDURE — 90662 IIV NO PRSV INCREASED AG IM: CPT

## 2021-09-22 PROCEDURE — G0008: CPT

## 2021-09-22 PROCEDURE — G0009: CPT

## 2021-09-22 PROCEDURE — 90732 PPSV23 VACC 2 YRS+ SUBQ/IM: CPT

## 2021-10-11 ENCOUNTER — APPOINTMENT (OUTPATIENT)
Dept: INTERNAL MEDICINE | Facility: CLINIC | Age: 71
End: 2021-10-11
Payer: MEDICARE

## 2021-10-11 VITALS
BODY MASS INDEX: 28.17 KG/M2 | WEIGHT: 159 LBS | HEIGHT: 63 IN | HEART RATE: 72 BPM | TEMPERATURE: 97.8 F | RESPIRATION RATE: 16 BRPM | OXYGEN SATURATION: 97 %

## 2021-10-11 VITALS — DIASTOLIC BLOOD PRESSURE: 72 MMHG | SYSTOLIC BLOOD PRESSURE: 120 MMHG

## 2021-10-11 PROCEDURE — 99213 OFFICE O/P EST LOW 20 MIN: CPT

## 2021-10-11 NOTE — PHYSICAL EXAM
[No Acute Distress] : no acute distress [Normal Sclera/Conjunctiva] : normal sclera/conjunctiva [Normal Outer Ear/Nose] : the outer ears and nose were normal in appearance [No JVD] : no jugular venous distention [No Respiratory Distress] : no respiratory distress  [Grossly Normal Strength/Tone] : grossly normal strength/tone [de-identified] : tender left lateral epicondyle

## 2021-10-11 NOTE — HISTORY OF PRESENT ILLNESS
[de-identified] : Pt comes for left elbow pain ad loss of strength   Pt has been picking up heavy items

## 2021-11-18 ENCOUNTER — TRANSCRIPTION ENCOUNTER (OUTPATIENT)
Age: 71
End: 2021-11-18

## 2021-11-29 ENCOUNTER — APPOINTMENT (OUTPATIENT)
Dept: RADIOLOGY | Facility: CLINIC | Age: 71
End: 2021-11-29
Payer: MEDICARE

## 2021-11-29 ENCOUNTER — APPOINTMENT (OUTPATIENT)
Dept: INTERNAL MEDICINE | Facility: CLINIC | Age: 71
End: 2021-11-29
Payer: MEDICARE

## 2021-11-29 VITALS — DIASTOLIC BLOOD PRESSURE: 72 MMHG | SYSTOLIC BLOOD PRESSURE: 128 MMHG

## 2021-11-29 VITALS
TEMPERATURE: 98 F | HEIGHT: 63 IN | RESPIRATION RATE: 16 BRPM | HEART RATE: 77 BPM | OXYGEN SATURATION: 98 % | WEIGHT: 159.5 LBS | BODY MASS INDEX: 28.26 KG/M2

## 2021-11-29 DIAGNOSIS — R07.81 PLEURODYNIA: ICD-10-CM

## 2021-11-29 DIAGNOSIS — M77.12 LATERAL EPICONDYLITIS, LEFT ELBOW: ICD-10-CM

## 2021-11-29 PROCEDURE — 71100 X-RAY EXAM RIBS UNI 2 VIEWS: CPT | Mod: RT

## 2021-11-29 PROCEDURE — 99213 OFFICE O/P EST LOW 20 MIN: CPT

## 2021-11-29 NOTE — HISTORY OF PRESENT ILLNESS
[de-identified] : Pt comes for swollen glands left neck and left lateral epicondylitis return and rt rib pain.  Pt has rheumatoid disease not active by history

## 2021-11-29 NOTE — REVIEW OF SYSTEMS
[Fever] : no fever [Chills] : no chills [Earache] : no earache [Nasal Discharge] : no nasal discharge [Chest Pain] : no chest pain [Palpitations] : no palpitations [Shortness Of Breath] : no shortness of breath [Wheezing] : no wheezing [Cough] : no cough

## 2021-11-29 NOTE — PHYSICAL EXAM
[No Acute Distress] : no acute distress [Normal Sclera/Conjunctiva] : normal sclera/conjunctiva [Normal Outer Ear/Nose] : the outer ears and nose were normal in appearance [Normal Oropharynx] : the oropharynx was normal [No Respiratory Distress] : no respiratory distress  [No Accessory Muscle Use] : no accessory muscle use [Clear to Auscultation] : lungs were clear to auscultation bilaterally [Normal Rate] : normal rate  [Regular Rhythm] : with a regular rhythm [No Carotid Bruits] : no carotid bruits [No Extremity Clubbing/Cyanosis] : no extremity clubbing/cyanosis [Soft] : abdomen soft [Non Tender] : non-tender [Non-distended] : non-distended [de-identified] : tender left anteriro cervical glands with no parotid swelling [de-identified] : rt rib pain [de-identified] : tender left lat epicondyle

## 2022-01-07 ENCOUNTER — APPOINTMENT (OUTPATIENT)
Dept: INTERNAL MEDICINE | Facility: CLINIC | Age: 72
End: 2022-01-07
Payer: MEDICARE

## 2022-01-07 VITALS
WEIGHT: 157 LBS | HEIGHT: 63 IN | TEMPERATURE: 98.5 F | OXYGEN SATURATION: 98 % | HEART RATE: 67 BPM | RESPIRATION RATE: 16 BRPM | BODY MASS INDEX: 27.82 KG/M2

## 2022-01-07 VITALS — SYSTOLIC BLOOD PRESSURE: 128 MMHG | DIASTOLIC BLOOD PRESSURE: 78 MMHG

## 2022-01-07 DIAGNOSIS — K59.09 OTHER CONSTIPATION: ICD-10-CM

## 2022-01-07 PROCEDURE — 99213 OFFICE O/P EST LOW 20 MIN: CPT

## 2022-01-07 NOTE — PHYSICAL EXAM
[No Acute Distress] : no acute distress [Normal Sclera/Conjunctiva] : normal sclera/conjunctiva [Normal Outer Ear/Nose] : the outer ears and nose were normal in appearance [Normal Oropharynx] : the oropharynx was normal [No JVD] : no jugular venous distention [No Lymphadenopathy] : no lymphadenopathy [No Respiratory Distress] : no respiratory distress  [No Accessory Muscle Use] : no accessory muscle use [Clear to Auscultation] : lungs were clear to auscultation bilaterally [Normal Rate] : normal rate  [Regular Rhythm] : with a regular rhythm [No Edema] : there was no peripheral edema [Soft] : abdomen soft [Non Tender] : non-tender [Non-distended] : non-distended [No Masses] : no abdominal mass palpated [Normal Bowel Sounds] : normal bowel sounds

## 2022-01-07 NOTE — HISTORY OF PRESENT ILLNESS
[de-identified] : Pt comes fo r abdominal constipation with some loose BM but not feeling as if she is emptying fully  Also c/o ear fullness and decreased  hearing  Last colonoscopy 1 year ago.  Pt has inflammatory disease stable at present

## 2022-01-07 NOTE — REVIEW OF SYSTEMS
[Hearing Loss] : hearing loss [Constipation] : constipation [Diarrhea] : diarrhea [Fever] : no fever [Chills] : no chills [Chest Pain] : no chest pain [Palpitations] : no palpitations [Lower Ext Edema] : no lower extremity edema [Shortness Of Breath] : no shortness of breath [Wheezing] : no wheezing [Cough] : no cough [Abdominal Pain] : no abdominal pain [Nausea] : no nausea [Vomiting] : no vomiting [Heartburn] : no heartburn

## 2022-01-27 ENCOUNTER — APPOINTMENT (OUTPATIENT)
Dept: GASTROENTEROLOGY | Facility: CLINIC | Age: 72
End: 2022-01-27

## 2022-03-07 ENCOUNTER — APPOINTMENT (OUTPATIENT)
Dept: INTERNAL MEDICINE | Facility: CLINIC | Age: 72
End: 2022-03-07
Payer: MEDICARE

## 2022-03-07 ENCOUNTER — APPOINTMENT (OUTPATIENT)
Dept: RADIOLOGY | Facility: CLINIC | Age: 72
End: 2022-03-07
Payer: MEDICARE

## 2022-03-07 VITALS
TEMPERATURE: 98 F | HEIGHT: 62 IN | RESPIRATION RATE: 16 BRPM | WEIGHT: 164 LBS | HEART RATE: 66 BPM | OXYGEN SATURATION: 99 % | BODY MASS INDEX: 30.18 KG/M2

## 2022-03-07 VITALS — SYSTOLIC BLOOD PRESSURE: 130 MMHG | DIASTOLIC BLOOD PRESSURE: 80 MMHG

## 2022-03-07 DIAGNOSIS — R61 GENERALIZED HYPERHIDROSIS: ICD-10-CM

## 2022-03-07 DIAGNOSIS — M06.9 RHEUMATOID ARTHRITIS, UNSPECIFIED: ICD-10-CM

## 2022-03-07 PROCEDURE — 99214 OFFICE O/P EST MOD 30 MIN: CPT | Mod: 25

## 2022-03-07 PROCEDURE — 71046 X-RAY EXAM CHEST 2 VIEWS: CPT

## 2022-03-07 PROCEDURE — 36415 COLL VENOUS BLD VENIPUNCTURE: CPT

## 2022-03-07 NOTE — REVIEW OF SYSTEMS
[Cough] : cough [Fever] : no fever [Chills] : no chills [Earache] : no earache [Nasal Discharge] : no nasal discharge [Chest Pain] : no chest pain [Palpitations] : no palpitations [Lower Ext Edema] : no lower extremity edema [Shortness Of Breath] : no shortness of breath [Wheezing] : no wheezing [Abdominal Pain] : no abdominal pain [Vomiting] : no vomiting [Fainting] : no fainting [Confusion] : no confusion [Depression] : no depression [Unsteady Walking] : no ataxia

## 2022-03-07 NOTE — HISTORY OF PRESENT ILLNESS
[de-identified] : Pt comes for prominence of muscles of upper chest on left side more than the right with respect to her Upper chest muscle below the clavicle  ALso had been getting night sweats a f ew times per week  Pt has been asymptomatic with respect to her rheumatoid arthritis

## 2022-03-07 NOTE — PHYSICAL EXAM
[No Acute Distress] : no acute distress [Normal Sclera/Conjunctiva] : normal sclera/conjunctiva [Normal Outer Ear/Nose] : the outer ears and nose were normal in appearance [Normal Oropharynx] : the oropharynx was normal [No JVD] : no jugular venous distention [No Lymphadenopathy] : no lymphadenopathy [No Respiratory Distress] : no respiratory distress  [No Accessory Muscle Use] : no accessory muscle use [Clear to Auscultation] : lungs were clear to auscultation bilaterally [Normal Rate] : normal rate  [Regular Rhythm] : with a regular rhythm [No Carotid Bruits] : no carotid bruits [No Edema] : there was no peripheral edema [No Extremity Clubbing/Cyanosis] : no extremity clubbing/cyanosis [Declined Breast Exam] : declined breast exam  [No Axillary Lymphadenopathy] : no axillary lymphadenopathy [Soft] : abdomen soft [Non Tender] : non-tender [Non-distended] : non-distended [No Masses] : no abdominal mass palpated [Grossly Normal Strength/Tone] : grossly normal strength/tone [Normal Gait] : normal gait [de-identified] : upper chest wall just belowe clavilce slightly more prominenet on the upper left side

## 2022-03-08 ENCOUNTER — TRANSCRIPTION ENCOUNTER (OUTPATIENT)
Age: 72
End: 2022-03-08

## 2022-03-08 LAB
ALBUMIN SERPL ELPH-MCNC: 4.2 G/DL
ALP BLD-CCNC: 71 U/L
ALT SERPL-CCNC: 17 U/L
ANION GAP SERPL CALC-SCNC: 11 MMOL/L
AST SERPL-CCNC: 17 U/L
BASOPHILS # BLD AUTO: 0.02 K/UL
BASOPHILS NFR BLD AUTO: 0.3 %
BILIRUB SERPL-MCNC: 0.5 MG/DL
BUN SERPL-MCNC: 15 MG/DL
CALCIUM SERPL-MCNC: 9.3 MG/DL
CHLORIDE SERPL-SCNC: 108 MMOL/L
CO2 SERPL-SCNC: 25 MMOL/L
CREAT SERPL-MCNC: 0.59 MG/DL
EGFR: 98 ML/MIN/1.73M2
EOSINOPHIL # BLD AUTO: 0.16 K/UL
EOSINOPHIL NFR BLD AUTO: 2.7 %
ERYTHROCYTE [SEDIMENTATION RATE] IN BLOOD BY WESTERGREN METHOD: 24 MM/HR
GLUCOSE SERPL-MCNC: 83 MG/DL
HCT VFR BLD CALC: 43 %
HGB BLD-MCNC: 13.9 G/DL
IMM GRANULOCYTES NFR BLD AUTO: 0.3 %
LDH SERPL-CCNC: 179 U/L
LYMPHOCYTES # BLD AUTO: 1.56 K/UL
LYMPHOCYTES NFR BLD AUTO: 25.9 %
MAN DIFF?: NORMAL
MCHC RBC-ENTMCNC: 30.2 PG
MCHC RBC-ENTMCNC: 32.3 GM/DL
MCV RBC AUTO: 93.3 FL
MONOCYTES # BLD AUTO: 0.37 K/UL
MONOCYTES NFR BLD AUTO: 6.1 %
NEUTROPHILS # BLD AUTO: 3.9 K/UL
NEUTROPHILS NFR BLD AUTO: 64.7 %
PLATELET # BLD AUTO: 231 K/UL
POTASSIUM SERPL-SCNC: 4.9 MMOL/L
PROT SERPL-MCNC: 6.6 G/DL
RBC # BLD: 4.61 M/UL
RBC # FLD: 13.2 %
SODIUM SERPL-SCNC: 144 MMOL/L
TSH SERPL-ACNC: 1.5 UIU/ML
WBC # FLD AUTO: 6.03 K/UL

## 2022-03-11 LAB
M TB IFN-G BLD-IMP: NEGATIVE
QUANTIFERON TB PLUS MITOGEN MINUS NIL: 0.82 IU/ML
QUANTIFERON TB PLUS NIL: 0.01 IU/ML
QUANTIFERON TB PLUS TB1 MINUS NIL: 0 IU/ML
QUANTIFERON TB PLUS TB2 MINUS NIL: -0.01 IU/ML

## 2022-08-09 ENCOUNTER — APPOINTMENT (OUTPATIENT)
Dept: OBGYN | Facility: CLINIC | Age: 72
End: 2022-08-09

## 2022-08-09 VITALS — DIASTOLIC BLOOD PRESSURE: 82 MMHG | SYSTOLIC BLOOD PRESSURE: 127 MMHG | HEIGHT: 62 IN

## 2022-08-09 DIAGNOSIS — L90.0 LICHEN SCLEROSUS ET ATROPHICUS: ICD-10-CM

## 2022-08-09 PROCEDURE — 99397 PER PM REEVAL EST PAT 65+ YR: CPT

## 2022-08-09 NOTE — HISTORY OF PRESENT ILLNESS
[Patient reported mammogram was normal] : Patient reported mammogram was normal [Patient reported PAP Smear was normal] : Patient reported PAP Smear was normal [Patient reported bone density results were abnormal] : Patient reported bone density results were abnormal [Patient reported colonoscopy was normal] : Patient reported colonoscopy was normal [FreeTextEntry1] : 72yo postmenopausal female presents for annual GYN visit\par Patient denies any GYN complaints \par \par Sexual Activity:yes, c/o dryness but declines vaginal estrogen states years ago OCPs caused mental health issues\par \par ROS: Denies fever/chills, HA, Cough/sore throat, CP, SOB, N/V, Diarrhea/Constipation, Pelvic pain, Urinary frequency/ urgency/ Incontinence, postmenopausal vaginal bleeding, abnormal vaginal discharge or irritation, Vasomotor symptoms, Moodiness,  and trouble sleeping\par \par OBhx:NSVDx2\par GYNhx: 11cm endometrioma, endometriosis\par PMH:none, osteoporosis declines all osteoporosis medications after having several bad reactions\par PSH:total hysterectomy and Bilateral Ooph.1997, cystocele repair x2 \par Meds:none\par ALL:Reclast- caused paralysis. \par Social:none\par FAMhx:monther and father smokers and emphassema, distant fam hx of BCA\par \par \par PCP:yearly\par Dermatologist:zeny\par Physical Activity:walks and used 2lb weights\par Diet:heatlhy\par VitaminD&Ca:yes\par \par  [Mammogramdate] : 8/2021 [PapSmeardate] : 8/2021 [BoneDensityDate] : 2011 [ColonoscopyDate] : 2021

## 2022-08-09 NOTE — PLAN
[FreeTextEntry1] : Annual: no papsmear needed, breast and bone screening orders as above\par \par Lichen sclerosis: triamcinalone reordered\par \par GYN counseling: Patient instructed to call for Unusual Pelvic pain,  UTI symptoms, irregular vaginal bleeding/discharge- Patient verbalized agreement\par \par F/U: patient to follow up in one year for annual GYN exam unless otherwise needed\par \par \par

## 2022-09-06 ENCOUNTER — RESULT REVIEW (OUTPATIENT)
Age: 72
End: 2022-09-06

## 2022-09-06 ENCOUNTER — APPOINTMENT (OUTPATIENT)
Dept: ULTRASOUND IMAGING | Facility: CLINIC | Age: 72
End: 2022-09-06

## 2022-09-06 ENCOUNTER — APPOINTMENT (OUTPATIENT)
Dept: MAMMOGRAPHY | Facility: CLINIC | Age: 72
End: 2022-09-06

## 2022-09-06 ENCOUNTER — APPOINTMENT (OUTPATIENT)
Dept: RADIOLOGY | Facility: CLINIC | Age: 72
End: 2022-09-06

## 2022-09-06 PROCEDURE — 77080 DXA BONE DENSITY AXIAL: CPT

## 2022-09-06 PROCEDURE — 77067 SCR MAMMO BI INCL CAD: CPT

## 2022-09-06 PROCEDURE — 76641 ULTRASOUND BREAST COMPLETE: CPT | Mod: 50

## 2022-09-06 PROCEDURE — 77063 BREAST TOMOSYNTHESIS BI: CPT

## 2022-09-07 ENCOUNTER — NON-APPOINTMENT (OUTPATIENT)
Age: 72
End: 2022-09-07

## 2022-10-18 ENCOUNTER — APPOINTMENT (OUTPATIENT)
Dept: OTOLARYNGOLOGY | Facility: CLINIC | Age: 72
End: 2022-10-18

## 2022-11-02 ENCOUNTER — APPOINTMENT (OUTPATIENT)
Dept: INTERNAL MEDICINE | Facility: CLINIC | Age: 72
End: 2022-11-02

## 2022-11-02 PROCEDURE — 90662 IIV NO PRSV INCREASED AG IM: CPT

## 2022-11-02 PROCEDURE — G0008: CPT

## 2022-11-07 ENCOUNTER — APPOINTMENT (OUTPATIENT)
Dept: INTERNAL MEDICINE | Facility: CLINIC | Age: 72
End: 2022-11-07

## 2022-11-07 VITALS
HEART RATE: 78 BPM | BODY MASS INDEX: 27.3 KG/M2 | HEIGHT: 62 IN | RESPIRATION RATE: 16 BRPM | TEMPERATURE: 97.8 F | OXYGEN SATURATION: 98 % | WEIGHT: 148.38 LBS

## 2022-11-07 VITALS — SYSTOLIC BLOOD PRESSURE: 128 MMHG | DIASTOLIC BLOOD PRESSURE: 82 MMHG

## 2022-11-07 DIAGNOSIS — U07.1 COVID-19: ICD-10-CM

## 2022-11-07 PROCEDURE — 36415 COLL VENOUS BLD VENIPUNCTURE: CPT

## 2022-11-07 PROCEDURE — 99213 OFFICE O/P EST LOW 20 MIN: CPT | Mod: 25

## 2022-11-07 NOTE — HISTORY OF PRESENT ILLNESS
[de-identified] : Pt comes for symptoms of brain fog and occasional dyspnea and muscle aches since having covid in sept 2022  Did not receive any treatment for covid   Hx of dvt left leg

## 2022-11-07 NOTE — REVIEW OF SYSTEMS
[Fatigue] : fatigue [Dyspnea on Exertion] : dyspnea on exertion [Fever] : no fever [Chills] : no chills [Sore Throat] : no sore throat [Chest Pain] : no chest pain [Palpitations] : no palpitations [Lower Ext Edema] : no lower extremity edema [Shortness Of Breath] : no shortness of breath [Wheezing] : no wheezing [Cough] : no cough [Abdominal Pain] : no abdominal pain

## 2022-11-07 NOTE — PHYSICAL EXAM
[No Acute Distress] : no acute distress [Normal Sclera/Conjunctiva] : normal sclera/conjunctiva [Normal Outer Ear/Nose] : the outer ears and nose were normal in appearance [No JVD] : no jugular venous distention [No Respiratory Distress] : no respiratory distress  [No Accessory Muscle Use] : no accessory muscle use [Normal Rate] : normal rate  [Regular Rhythm] : with a regular rhythm [No Carotid Bruits] : no carotid bruits [No Edema] : there was no peripheral edema [No Extremity Clubbing/Cyanosis] : no extremity clubbing/cyanosis [Soft] : abdomen soft [Non Tender] : non-tender [Grossly Normal Strength/Tone] : grossly normal strength/tone [No Focal Deficits] : no focal deficits [Normal Gait] : normal gait

## 2022-11-08 ENCOUNTER — APPOINTMENT (OUTPATIENT)
Dept: RADIOLOGY | Facility: CLINIC | Age: 72
End: 2022-11-08

## 2022-11-08 LAB
BASOPHILS # BLD AUTO: 0.03 K/UL
BASOPHILS NFR BLD AUTO: 0.5 %
COVID-19 NUCLEOCAPSID  GAM ANTIBODY INTERPRETATION: POSITIVE
EOSINOPHIL # BLD AUTO: 0.2 K/UL
EOSINOPHIL NFR BLD AUTO: 3 %
HCT VFR BLD CALC: 43.1 %
HGB BLD-MCNC: 13.8 G/DL
IMM GRANULOCYTES NFR BLD AUTO: 0.5 %
LYMPHOCYTES # BLD AUTO: 1.81 K/UL
LYMPHOCYTES NFR BLD AUTO: 27.2 %
MAN DIFF?: NORMAL
MCHC RBC-ENTMCNC: 29.3 PG
MCHC RBC-ENTMCNC: 32 GM/DL
MCV RBC AUTO: 91.5 FL
MONOCYTES # BLD AUTO: 0.39 K/UL
MONOCYTES NFR BLD AUTO: 5.9 %
NEUTROPHILS # BLD AUTO: 4.2 K/UL
NEUTROPHILS NFR BLD AUTO: 62.9 %
PLATELET # BLD AUTO: 253 K/UL
RBC # BLD: 4.71 M/UL
RBC # FLD: 13.8 %
SARS-COV-2 AB SERPL QL IA: 57.4 INDEX
WBC # FLD AUTO: 6.66 K/UL

## 2022-11-08 PROCEDURE — 71046 X-RAY EXAM CHEST 2 VIEWS: CPT

## 2022-11-09 LAB
ALBUMIN SERPL ELPH-MCNC: 4.5 G/DL
ALP BLD-CCNC: 81 U/L
ALT SERPL-CCNC: 9 U/L
ANION GAP SERPL CALC-SCNC: 13 MMOL/L
AST SERPL-CCNC: 14 U/L
BILIRUB SERPL-MCNC: 0.5 MG/DL
BUN SERPL-MCNC: 13 MG/DL
CALCIUM SERPL-MCNC: 9.5 MG/DL
CHLORIDE SERPL-SCNC: 107 MMOL/L
CHOLEST SERPL-MCNC: 221 MG/DL
CK SERPL-CCNC: 55 U/L
CO2 SERPL-SCNC: 23 MMOL/L
CREAT SERPL-MCNC: 0.61 MG/DL
DEPRECATED D DIMER PPP IA-ACNC: <150 NG/ML DDU
EGFR: 96 ML/MIN/1.73M2
ERYTHROCYTE [SEDIMENTATION RATE] IN BLOOD BY WESTERGREN METHOD: 57 MM/HR
GLUCOSE SERPL-MCNC: 92 MG/DL
HDLC SERPL-MCNC: 63 MG/DL
LDLC SERPL CALC-MCNC: 141 MG/DL
NONHDLC SERPL-MCNC: 159 MG/DL
POTASSIUM SERPL-SCNC: 4.5 MMOL/L
PROT SERPL-MCNC: 6.9 G/DL
SODIUM SERPL-SCNC: 143 MMOL/L
TRIGL SERPL-MCNC: 89 MG/DL
TSH SERPL-ACNC: 2.6 UIU/ML

## 2022-12-01 ENCOUNTER — APPOINTMENT (OUTPATIENT)
Dept: RHEUMATOLOGY | Facility: CLINIC | Age: 72
End: 2022-12-01

## 2022-12-01 VITALS
BODY MASS INDEX: 28.02 KG/M2 | TEMPERATURE: 98.1 F | SYSTOLIC BLOOD PRESSURE: 153 MMHG | HEIGHT: 62 IN | DIASTOLIC BLOOD PRESSURE: 79 MMHG | RESPIRATION RATE: 14 BRPM | OXYGEN SATURATION: 98 % | HEART RATE: 66 BPM | WEIGHT: 152.25 LBS

## 2022-12-01 PROCEDURE — 99205 OFFICE O/P NEW HI 60 MIN: CPT

## 2022-12-15 ENCOUNTER — APPOINTMENT (OUTPATIENT)
Dept: RHEUMATOLOGY | Facility: CLINIC | Age: 72
End: 2022-12-15

## 2022-12-15 VITALS
WEIGHT: 150.13 LBS | OXYGEN SATURATION: 97 % | HEART RATE: 70 BPM | HEIGHT: 62 IN | BODY MASS INDEX: 27.63 KG/M2 | DIASTOLIC BLOOD PRESSURE: 78 MMHG | SYSTOLIC BLOOD PRESSURE: 138 MMHG | RESPIRATION RATE: 16 BRPM | TEMPERATURE: 98.2 F

## 2022-12-15 LAB
A/G RATIO: 1.1
ALBUMIN SERPL ELPH-MCNC: 4.3 G/DL
ALBUMIN: 4 G/DL
ALP BLD-CCNC: 76 U/L
ALPHA-1-GLOBULIN: 0.3 G/DL
ALPHA-2-GLOBULIN: 0.9 G/DL
ALT SERPL-CCNC: 11 U/L
ANA SER IF-ACNC: NEGATIVE
ANION GAP SERPL CALC-SCNC: 12 MMOL/L
AST SERPL-CCNC: 15 U/L
BASOPHILS # BLD AUTO: 0.03 K/UL
BASOPHILS NFR BLD AUTO: 0.4 %
BETA GLOBULIN: 1.2 G/DL
BILIRUB SERPL-MCNC: 0.6 MG/DL
BUN SERPL-MCNC: 17 MG/DL
C3 SERPL-MCNC: 126 MG/DL
C4 SERPL-MCNC: 30 MG/DL
CALCIUM SERPL-MCNC: 9.5 MG/DL
CCP AB SER IA-ACNC: <8 UNITS
CENTROMERE IGG SER-ACNC: <0.2 CD:130001892
CHLORIDE SERPL-SCNC: 105 MMOL/L
CHROMATIN AB SERPL-ACNC: <0.2 AL
CK SERPL-CCNC: 51 U/L
CO2 SERPL-SCNC: 25 MMOL/L
CREAT SERPL-MCNC: 0.55 MG/DL
CRP SERPL-MCNC: <3 MG/L
DSDNA AB SER-ACNC: <12 IU/ML
EGFR: 99 ML/MIN/1.73M2
ENA RNP AB SER IA-ACNC: <0.2 AL
ENA SS-A AB SER IA-ACNC: <0.2 AL
ENA SS-B AB SER IA-ACNC: <0.2 AL
EOSINOPHIL # BLD AUTO: 0.16 K/UL
EOSINOPHIL NFR BLD AUTO: 2.3 %
ERYTHROCYTE [SEDIMENTATION RATE] IN BLOOD BY WESTERGREN METHOD: 70 MM/HR
G6PD SER-CCNC: 13.1 U/G HGB
GAMMA GLOBULIN: 1.2 G/DL
GLOBULIN SER CALC-MCNC: 3.6 G/DL
GLUCOSE SERPL-MCNC: 88 MG/DL
HAV IGM SER QL: NONREACTIVE
HBV CORE IGG+IGM SER QL: NONREACTIVE
HBV CORE IGM SER QL: NONREACTIVE
HBV E AB SER QL: NONREACTIVE
HBV E AG SER QL: NONREACTIVE
HBV SURFACE AB SER QL: NONREACTIVE
HBV SURFACE AG SER QL: NONREACTIVE
HCT VFR BLD CALC: 43.5 %
HCV AB SER QL: NONREACTIVE
HCV S/CO RATIO: 0.14 S/CO
HEPATITIS A IGG ANTIBODY: NONREACTIVE
HEPATITIS A IGG ANTIBODY: NONREACTIVE
HGB BLD-MCNC: 14.3 G/DL
IFE REFLEX: NORMAL
IMM GRANULOCYTES NFR BLD AUTO: 0.3 %
LYMPHOCYTES # BLD AUTO: 1.42 K/UL
LYMPHOCYTES NFR BLD AUTO: 20.7 %
Lab: NORMAL
M TB IFN-G BLD-IMP: ABNORMAL
M-SPIKE: NORMAL G/DL
MAN DIFF?: NORMAL
MCHC RBC-ENTMCNC: 29.7 PG
MCHC RBC-ENTMCNC: 32.9 GM/DL
MCV RBC AUTO: 90.4 FL
MONOCYTES # BLD AUTO: 0.3 K/UL
MONOCYTES NFR BLD AUTO: 4.4 %
NEUTROPHILS # BLD AUTO: 4.92 K/UL
NEUTROPHILS NFR BLD AUTO: 71.9 %
PLATELET # BLD AUTO: 256 K/UL
POTASSIUM SERPL-SCNC: 4.3 MMOL/L
PROT SERPL-MCNC: 7 G/DL
PROTEIN, TOTAL: 7.6 G/DL
QUANTIFERON TB PLUS MITOGEN MINUS NIL: 0.23 IU/ML
QUANTIFERON TB PLUS NIL: 0.03 IU/ML
QUANTIFERON TB PLUS TB1 MINUS NIL: 0 IU/ML
QUANTIFERON TB PLUS TB2 MINUS NIL: -0.01 IU/ML
RBC # BLD: 4.81 M/UL
RBC # FLD: 13.9 %
RF+CCP IGG SER-IMP: NEGATIVE
RHEUMATOID FACT SER QL: 12 IU/ML
SODIUM SERPL-SCNC: 142 MMOL/L
THYROGLOB AB SERPL-ACNC: <20 IU/ML
THYROPEROXIDASE AB SERPL IA-ACNC: 13.1 IU/ML
TSH SERPL-ACNC: 1.76 UIU/ML
WBC # FLD AUTO: 6.85 K/UL

## 2022-12-15 PROCEDURE — 99214 OFFICE O/P EST MOD 30 MIN: CPT

## 2022-12-15 NOTE — ASSESSMENT
[FreeTextEntry1] : Hx of elevated inflammatory markers\par Generalized arthralgias, myalgia\par \par ESR: 70, otherwise labs unremarkable\par \par ROS and PE otherwise unremarkable for underlying classic CTD/ systemic autoimmune disease\par \par - NSAIDS/Tylenol prn for pain \par - OTC topical analgesics\par \par Discussed treatment plan with the patient and her . The patient was given the opportunity to ask questions and all questions were answered to their satisfaction.

## 2022-12-15 NOTE — HISTORY OF PRESENT ILLNESS
[FreeTextEntry1] : Pt presenting today for a f.u visit. labs from 12/2022 reviewed with pt. \par esr: 70, otherwise unremarkable labs. \par Continues to experience intermittent arthralgias, myalgia, fatigue. \par Has pain and stiffness to one joint/muscle and the next day has pain to a different area\par Taking aleve prn for pain. Comfortable with aleve prn. Does not want to change/add medications at this time. \par ROS otherwise unchanged from LV. No recent falls.

## 2022-12-15 NOTE — HISTORY OF PRESENT ILLNESS
[FreeTextEntry1] : 72 year old female with PMH as listed below presents today for an initial evaluation/ establish care\par \par Was previously following Dr. Durant every year for elevated inflammatory markers and then lost to follow up\par Has hx of intermittent polyarthralgias, polyarthritis with "episodic flares" occurring every 2-3 years. \par Was started on HCQ in the past but stopped as she developed eye problems? \par \par Today presenting with intermittent arthralgias, myalgia, fatigue. \par Her symptoms are random and "traveling"\par Has pain and stiffness to one joint/muscle and the next day has pain to a different area\par No swelling to the joints\par No recent trauma, falls. \par \par Does not like to take medications for pain. \par Currently taking Aleve prn only \par \par Denies fever, chills, CP, SOB, abd pain, rashes\par \par Lives at home with her . No recent falls.

## 2022-12-15 NOTE — ASSESSMENT
[FreeTextEntry1] : Hx of elevated inflammatory markers\par Generalized arthralgias, myalgia\par \par ROS and PE otherwise unremarkable for underlying classic CTD/ systemic autoimmune disease\par \par - labs as below. Will call pt with results\par - NSAIDS/Tylenol prn for pain (reviewed risk and benefits of medications)\par - OTC topical analgesics\par \par Discussed treatment plan with the patient. The patient was given the opportunity to ask questions and all questions were answered to their satisfaction.

## 2023-02-01 ENCOUNTER — APPOINTMENT (OUTPATIENT)
Dept: INTERNAL MEDICINE | Facility: CLINIC | Age: 73
End: 2023-02-01
Payer: MEDICARE

## 2023-02-01 VITALS — DIASTOLIC BLOOD PRESSURE: 78 MMHG | SYSTOLIC BLOOD PRESSURE: 132 MMHG

## 2023-02-01 VITALS
BODY MASS INDEX: 27.27 KG/M2 | RESPIRATION RATE: 16 BRPM | HEIGHT: 62 IN | OXYGEN SATURATION: 98 % | WEIGHT: 148.19 LBS | TEMPERATURE: 97.9 F | HEART RATE: 73 BPM

## 2023-02-01 DIAGNOSIS — Z23 ENCOUNTER FOR IMMUNIZATION: ICD-10-CM

## 2023-02-01 DIAGNOSIS — R41.3 OTHER AMNESIA: ICD-10-CM

## 2023-02-01 PROCEDURE — G0009: CPT

## 2023-02-01 PROCEDURE — 99214 OFFICE O/P EST MOD 30 MIN: CPT | Mod: 25

## 2023-02-01 PROCEDURE — 90677 PCV20 VACCINE IM: CPT

## 2023-02-01 PROCEDURE — 36415 COLL VENOUS BLD VENIPUNCTURE: CPT

## 2023-02-01 NOTE — REVIEW OF SYSTEMS
[Joint Pain] : joint pain [Joint Stiffness] : joint stiffness [Muscle Pain] : muscle pain [Memory Loss] : memory loss [Fever] : no fever [Chills] : no chills [Chest Pain] : no chest pain [Palpitations] : no palpitations [Lower Ext Edema] : no lower extremity edema [Shortness Of Breath] : no shortness of breath [Wheezing] : no wheezing [Cough] : no cough [Abdominal Pain] : no abdominal pain [Headache] : no headache [Dizziness] : no dizziness [Unsteady Walking] : no ataxia

## 2023-02-01 NOTE — PHYSICAL EXAM
[No Acute Distress] : no acute distress [Well Nourished] : well nourished [Normal Sclera/Conjunctiva] : normal sclera/conjunctiva [PERRL] : pupils equal round and reactive to light [Normal Outer Ear/Nose] : the outer ears and nose were normal in appearance [No JVD] : no jugular venous distention [No Lymphadenopathy] : no lymphadenopathy [No Respiratory Distress] : no respiratory distress  [No Accessory Muscle Use] : no accessory muscle use [Clear to Auscultation] : lungs were clear to auscultation bilaterally [Normal Rate] : normal rate  [Regular Rhythm] : with a regular rhythm [No Carotid Bruits] : no carotid bruits [No Edema] : there was no peripheral edema [No Extremity Clubbing/Cyanosis] : no extremity clubbing/cyanosis [Soft] : abdomen soft [Non Tender] : non-tender [Non-distended] : non-distended [Normal Bowel Sounds] : normal bowel sounds [Grossly Normal Strength/Tone] : grossly normal strength/tone [No Focal Deficits] : no focal deficits [Normal Affect] : the affect was normal

## 2023-02-01 NOTE — HISTORY OF PRESENT ILLNESS
[de-identified] : Pt c/o memory issues  Was evaluated a few yrs ago and told her memory was appropriate  Pt sleeps poorly

## 2023-02-03 DIAGNOSIS — F41.9 ANXIETY DISORDER, UNSPECIFIED: ICD-10-CM

## 2023-02-04 LAB
ALBUMIN SERPL ELPH-MCNC: 4.5 G/DL
ALP BLD-CCNC: 78 U/L
ALT SERPL-CCNC: 13 U/L
ANION GAP SERPL CALC-SCNC: 12 MMOL/L
AST SERPL-CCNC: 16 U/L
BASOPHILS # BLD AUTO: 0.03 K/UL
BASOPHILS NFR BLD AUTO: 0.5 %
BILIRUB SERPL-MCNC: 0.4 MG/DL
BUN SERPL-MCNC: 13 MG/DL
CALCIUM SERPL-MCNC: 9.9 MG/DL
CHLORIDE SERPL-SCNC: 104 MMOL/L
CHOLEST SERPL-MCNC: 241 MG/DL
CO2 SERPL-SCNC: 28 MMOL/L
CREAT SERPL-MCNC: 0.6 MG/DL
EGFR: 96 ML/MIN/1.73M2
EOSINOPHIL # BLD AUTO: 0.23 K/UL
EOSINOPHIL NFR BLD AUTO: 3.8 %
ERYTHROCYTE [SEDIMENTATION RATE] IN BLOOD BY WESTERGREN METHOD: 13 MM/HR
ESTIMATED AVERAGE GLUCOSE: 103 MG/DL
GLUCOSE SERPL-MCNC: 86 MG/DL
HBA1C MFR BLD HPLC: 5.2 %
HCT VFR BLD CALC: 44.3 %
HDLC SERPL-MCNC: 69 MG/DL
HGB BLD-MCNC: 14.5 G/DL
IMM GRANULOCYTES NFR BLD AUTO: 0.2 %
LDLC SERPL CALC-MCNC: 155 MG/DL
LYMPHOCYTES # BLD AUTO: 1.91 K/UL
LYMPHOCYTES NFR BLD AUTO: 31.8 %
MAN DIFF?: NORMAL
MCHC RBC-ENTMCNC: 30.3 PG
MCHC RBC-ENTMCNC: 32.7 GM/DL
MCV RBC AUTO: 92.5 FL
MONOCYTES # BLD AUTO: 0.35 K/UL
MONOCYTES NFR BLD AUTO: 5.8 %
NEUTROPHILS # BLD AUTO: 3.47 K/UL
NEUTROPHILS NFR BLD AUTO: 57.9 %
NONHDLC SERPL-MCNC: 172 MG/DL
PLATELET # BLD AUTO: 270 K/UL
POTASSIUM SERPL-SCNC: 5.1 MMOL/L
PROT SERPL-MCNC: 7.1 G/DL
RBC # BLD: 4.79 M/UL
RBC # FLD: 12.9 %
SODIUM SERPL-SCNC: 143 MMOL/L
TRIGL SERPL-MCNC: 86 MG/DL
TSH SERPL-ACNC: 2.63 UIU/ML
VIT B12 SERPL-MCNC: >2000 PG/ML
WBC # FLD AUTO: 6 K/UL

## 2023-02-06 ENCOUNTER — APPOINTMENT (OUTPATIENT)
Dept: ULTRASOUND IMAGING | Facility: CLINIC | Age: 73
End: 2023-02-06
Payer: MEDICARE

## 2023-02-06 PROCEDURE — 93880 EXTRACRANIAL BILAT STUDY: CPT

## 2023-07-10 ENCOUNTER — TRANSCRIPTION ENCOUNTER (OUTPATIENT)
Age: 73
End: 2023-07-10

## 2023-07-10 ENCOUNTER — NON-APPOINTMENT (OUTPATIENT)
Age: 73
End: 2023-07-10

## 2023-07-10 ENCOUNTER — APPOINTMENT (OUTPATIENT)
Dept: ULTRASOUND IMAGING | Facility: CLINIC | Age: 73
End: 2023-07-10
Payer: MEDICARE

## 2023-07-10 ENCOUNTER — APPOINTMENT (OUTPATIENT)
Dept: INTERNAL MEDICINE | Facility: CLINIC | Age: 73
End: 2023-07-10
Payer: MEDICARE

## 2023-07-10 VITALS — BODY MASS INDEX: 28.52 KG/M2 | HEART RATE: 68 BPM | OXYGEN SATURATION: 97 % | WEIGHT: 155 LBS | HEIGHT: 62 IN

## 2023-07-10 VITALS — SYSTOLIC BLOOD PRESSURE: 132 MMHG | DIASTOLIC BLOOD PRESSURE: 78 MMHG

## 2023-07-10 DIAGNOSIS — Z86.010 PERSONAL HISTORY OF COLONIC POLYPS: ICD-10-CM

## 2023-07-10 DIAGNOSIS — M79.661 PAIN IN RIGHT LOWER LEG: ICD-10-CM

## 2023-07-10 DIAGNOSIS — M19.90 UNSPECIFIED OSTEOARTHRITIS, UNSPECIFIED SITE: ICD-10-CM

## 2023-07-10 PROCEDURE — G0439: CPT

## 2023-07-10 PROCEDURE — 93970 EXTREMITY STUDY: CPT

## 2023-07-10 PROCEDURE — 99214 OFFICE O/P EST MOD 30 MIN: CPT | Mod: 25

## 2023-07-10 PROCEDURE — 36415 COLL VENOUS BLD VENIPUNCTURE: CPT

## 2023-07-10 PROCEDURE — 93000 ELECTROCARDIOGRAM COMPLETE: CPT

## 2023-07-10 RX ORDER — MONTELUKAST 10 MG/1
10 TABLET, FILM COATED ORAL
Qty: 90 | Refills: 1 | Status: DISCONTINUED | COMMUNITY
Start: 2022-03-07 | End: 2023-07-10

## 2023-07-10 RX ORDER — CEPHALEXIN 500 MG/1
500 TABLET ORAL 3 TIMES DAILY
Qty: 21 | Refills: 0 | Status: COMPLETED | COMMUNITY
Start: 2021-11-29 | End: 2023-07-10

## 2023-07-10 NOTE — HISTORY OF PRESENT ILLNESS
[de-identified] : Pt comes for FBW and exam and EKG and annual medicare well visit   Pt sees Dr Mika MARTINS and has not seen a derm in a long time   Pt also sees Dr Jacob for rheum.  Pt has been having rt calf pain since friday  Hx of DVT in left leg in past  no trauma

## 2023-07-10 NOTE — REVIEW OF SYSTEMS
[Muscle Pain] : muscle pain [Fever] : no fever [Chills] : no chills [Discharge] : no discharge [Vision Problems] : no vision problems [Earache] : no earache [Sore Throat] : no sore throat [Chest Pain] : no chest pain [Palpitations] : no palpitations [Lower Ext Edema] : no lower extremity edema [Shortness Of Breath] : no shortness of breath [Wheezing] : no wheezing [Cough] : no cough [Abdominal Pain] : no abdominal pain [Vomiting] : no vomiting [Dysuria] : no dysuria [Hematuria] : no hematuria [Joint Pain] : no joint pain [Joint Stiffness] : no joint stiffness [Skin Rash] : no skin rash [Headache] : no headache [Dizziness] : no dizziness [Fainting] : no fainting [Confusion] : no confusion [Memory Loss] : no memory loss [Unsteady Walking] : no ataxia [Depression] : no depression [Swollen Glands] : no swollen glands

## 2023-07-10 NOTE — HEALTH RISK ASSESSMENT
[Never (0 pts)] : Never (0 points) [No] : In the past 12 months have you used drugs other than those required for medical reasons? No [No falls in past year] : Patient reported no falls in the past year [0] : 2) Feeling down, depressed, or hopeless: Not at all (0) [PHQ-2 Negative - No further assessment needed] : PHQ-2 Negative - No further assessment needed [None] : None [With Significant Other] : lives with significant other [Retired] : retired [] :  [Feels Safe at Home] : Feels safe at home [Fully functional (bathing, dressing, toileting, transferring, walking, feeding)] : Fully functional (bathing, dressing, toileting, transferring, walking, feeding) [Fully functional (using the telephone, shopping, preparing meals, housekeeping, doing laundry, using] : Fully functional and needs no help or supervision to perform IADLs (using the telephone, shopping, preparing meals, housekeeping, doing laundry, using transportation, managing medications and managing finances) [Reports normal functional visual acuity (ie: able to read med bottle)] : Reports normal functional visual acuity [With Patient/Caregiver] : , with patient/caregiver [Designated Healthcare Proxy] : Designated healthcare proxy [Name: ___] : Health Care Proxy's Name: [unfilled]  [Relationship: ___] : Relationship: [unfilled] [I will adhere to the patient's wishes.] : I will adhere to the patient's wishes. [Audit-CScore] : 0 [[id number]] : 0 [Change in mental status noted] : No change in mental status noted [Language] : denies difficulty with language [Behavior] : denies difficulty with behavior [Learning/Retaining New Information] : denies difficulty learning/retaining new information [Handling Complex Tasks] : denies difficulty handling complex tasks [Reasoning] : denies difficulty with reasoning [Spatial Ability and Orientation] : denies difficulty with spatial ability and orientation [Reports changes in hearing] : Reports no changes in hearing [Reports changes in vision] : Reports no changes in vision [Reports changes in dental health] : Reports no changes in dental health [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

## 2023-07-10 NOTE — PHYSICAL EXAM
[No Acute Distress] : no acute distress [Well Nourished] : well nourished [Normal Sclera/Conjunctiva] : normal sclera/conjunctiva [PERRL] : pupils equal round and reactive to light [EOMI] : extraocular movements intact [Normal Outer Ear/Nose] : the outer ears and nose were normal in appearance [Normal Oropharynx] : the oropharynx was normal [No JVD] : no jugular venous distention [No Lymphadenopathy] : no lymphadenopathy [Supple] : supple [No Respiratory Distress] : no respiratory distress  [No Accessory Muscle Use] : no accessory muscle use [Clear to Auscultation] : lungs were clear to auscultation bilaterally [Normal Rate] : normal rate  [Regular Rhythm] : with a regular rhythm [No Carotid Bruits] : no carotid bruits [Pedal Pulses Present] : the pedal pulses are present [No Edema] : there was no peripheral edema [No Extremity Clubbing/Cyanosis] : no extremity clubbing/cyanosis [Soft] : abdomen soft [Non Tender] : non-tender [Non-distended] : non-distended [No Masses] : no abdominal mass palpated [Normal Bowel Sounds] : normal bowel sounds [Normal Anterior Cervical Nodes] : no anterior cervical lymphadenopathy [No Spinal Tenderness] : no spinal tenderness [Grossly Normal Strength/Tone] : grossly normal strength/tone [No Rash] : no rash [No Focal Deficits] : no focal deficits [Normal Affect] : the affect was normal [Normal Insight/Judgement] : insight and judgment were intact [de-identified] : rt calf tender

## 2023-07-11 LAB
ALBUMIN SERPL ELPH-MCNC: 4.3 G/DL
ALP BLD-CCNC: 79 U/L
ALT SERPL-CCNC: 19 U/L
ANION GAP SERPL CALC-SCNC: 11 MMOL/L
APPEARANCE: CLEAR
AST SERPL-CCNC: 21 U/L
BACTERIA: NEGATIVE /HPF
BILIRUB SERPL-MCNC: 0.4 MG/DL
BILIRUBIN URINE: NEGATIVE
BLOOD URINE: NEGATIVE
BUN SERPL-MCNC: 12 MG/DL
CALCIUM SERPL-MCNC: 9.5 MG/DL
CAST: 0 /LPF
CHLORIDE SERPL-SCNC: 104 MMOL/L
CHOLEST SERPL-MCNC: 229 MG/DL
CO2 SERPL-SCNC: 25 MMOL/L
COLOR: YELLOW
CREAT SERPL-MCNC: 0.55 MG/DL
EGFR: 99 ML/MIN/1.73M2
EPITHELIAL CELLS: 1 /HPF
GLUCOSE QUALITATIVE U: NEGATIVE MG/DL
GLUCOSE SERPL-MCNC: 87 MG/DL
HDLC SERPL-MCNC: 69 MG/DL
KETONES URINE: NEGATIVE MG/DL
LDLC SERPL CALC-MCNC: 133 MG/DL
LEUKOCYTE ESTERASE URINE: NEGATIVE
MICROSCOPIC-UA: NORMAL
NITRITE URINE: NEGATIVE
NONHDLC SERPL-MCNC: 160 MG/DL
PH URINE: 6.5
POTASSIUM SERPL-SCNC: 4.9 MMOL/L
PROT SERPL-MCNC: 6.9 G/DL
PROTEIN URINE: NEGATIVE MG/DL
RED BLOOD CELLS URINE: 2 /HPF
SODIUM SERPL-SCNC: 141 MMOL/L
SPECIFIC GRAVITY URINE: 1.02
TRIGL SERPL-MCNC: 152 MG/DL
TSH SERPL-ACNC: 1.91 UIU/ML
UROBILINOGEN URINE: 0.2 MG/DL
WHITE BLOOD CELLS URINE: 0 /HPF

## 2023-07-14 ENCOUNTER — APPOINTMENT (OUTPATIENT)
Dept: INTERNAL MEDICINE | Facility: CLINIC | Age: 73
End: 2023-07-14

## 2023-07-31 ENCOUNTER — RX RENEWAL (OUTPATIENT)
Age: 73
End: 2023-07-31

## 2023-08-07 ENCOUNTER — APPOINTMENT (OUTPATIENT)
Dept: INTERNAL MEDICINE | Facility: CLINIC | Age: 73
End: 2023-08-07
Payer: MEDICARE

## 2023-08-07 VITALS — HEART RATE: 66 BPM | OXYGEN SATURATION: 97 % | WEIGHT: 156 LBS | HEIGHT: 62 IN | BODY MASS INDEX: 28.71 KG/M2

## 2023-08-07 VITALS — DIASTOLIC BLOOD PRESSURE: 78 MMHG | SYSTOLIC BLOOD PRESSURE: 132 MMHG

## 2023-08-07 PROCEDURE — 99213 OFFICE O/P EST LOW 20 MIN: CPT | Mod: 25

## 2023-08-07 PROCEDURE — 36415 COLL VENOUS BLD VENIPUNCTURE: CPT

## 2023-08-07 RX ORDER — APIXABAN 2.5 MG/1
2.5 TABLET, FILM COATED ORAL
Qty: 60 | Refills: 0 | Status: DISCONTINUED | COMMUNITY
Start: 2023-07-10 | End: 2023-08-07

## 2023-08-07 NOTE — HISTORY OF PRESENT ILLNESS
[de-identified] : Pt comes for recheck  on Eliquis 5 mg bid  for 2nd DVT    No bruising or bleeding

## 2023-08-14 ENCOUNTER — APPOINTMENT (OUTPATIENT)
Dept: OBGYN | Facility: CLINIC | Age: 73
End: 2023-08-14
Payer: MEDICARE

## 2023-08-14 VITALS
HEIGHT: 62 IN | WEIGHT: 160 LBS | DIASTOLIC BLOOD PRESSURE: 80 MMHG | BODY MASS INDEX: 29.44 KG/M2 | SYSTOLIC BLOOD PRESSURE: 128 MMHG

## 2023-08-14 DIAGNOSIS — Z01.419 ENCOUNTER FOR GYNECOLOGICAL EXAMINATION (GENERAL) (ROUTINE) W/OUT ABNORMAL FINDINGS: ICD-10-CM

## 2023-08-14 PROCEDURE — 99397 PER PM REEVAL EST PAT 65+ YR: CPT

## 2023-08-14 RX ORDER — SODIUM SULFATE, MAGNESIUM SULFATE, AND POTASSIUM CHLORIDE 17.75; 2.7; 2.25 G/1; G/1; G/1
1479-225-188 TABLET ORAL
Qty: 1 | Refills: 0 | Status: COMPLETED | COMMUNITY
Start: 2021-04-08 | End: 2023-08-14

## 2023-08-14 RX ORDER — DICLOFENAC SODIUM 75 MG/1
75 TABLET, DELAYED RELEASE ORAL
Qty: 20 | Refills: 0 | Status: COMPLETED | COMMUNITY
Start: 2021-10-11 | End: 2023-08-14

## 2023-08-14 RX ORDER — CEPHALEXIN 500 MG/1
500 CAPSULE ORAL 3 TIMES DAILY
Qty: 21 | Refills: 0 | Status: COMPLETED | COMMUNITY
Start: 2021-11-29 | End: 2023-08-14

## 2023-08-14 RX ORDER — ALPRAZOLAM 0.25 MG/1
0.25 TABLET ORAL TWICE DAILY
Qty: 10 | Refills: 0 | Status: COMPLETED | COMMUNITY
Start: 2023-02-03 | End: 2023-08-14

## 2023-08-14 NOTE — PHYSICAL EXAM
[Chaperone Present] : A chaperone was present in the examining room during all aspects of the physical examination [Appropriately responsive] : appropriately responsive [Alert] : alert [No Acute Distress] : no acute distress [Soft] : soft [Non-tender] : non-tender [Non-distended] : non-distended [Oriented x3] : oriented x3 [Examination Of The Breasts] : a normal appearance [No Masses] : no breast masses were palpable [Labia Majora] : normal [Labia Minora] : normal [Normal] : normal [Absent] : absent [Uterine Adnexae] : absent

## 2023-08-14 NOTE — HISTORY OF PRESENT ILLNESS
[TextBox_4] : 73yo presents for annual exam h/o total hysterectomy, BSO, cystocele repair - hyst was for a "huge tumor" - benign, endometriosis  h/o lichen scelorsus  has dvt - on elloquis  states her bottom feels irritated -not austin eif its the elloquis or from her leaking urine has always had isssue leaking urine - had surgery x 2  [Mammogramdate] : 2022 [BoneDensityDate] : 2022 [ColonoscopyDate] : 2016

## 2023-09-13 ENCOUNTER — APPOINTMENT (OUTPATIENT)
Dept: ULTRASOUND IMAGING | Facility: CLINIC | Age: 73
End: 2023-09-13
Payer: MEDICARE

## 2023-09-13 ENCOUNTER — APPOINTMENT (OUTPATIENT)
Dept: MAMMOGRAPHY | Facility: CLINIC | Age: 73
End: 2023-09-13
Payer: MEDICARE

## 2023-09-13 ENCOUNTER — RESULT REVIEW (OUTPATIENT)
Age: 73
End: 2023-09-13

## 2023-09-13 ENCOUNTER — APPOINTMENT (OUTPATIENT)
Dept: INTERNAL MEDICINE | Facility: CLINIC | Age: 73
End: 2023-09-13
Payer: MEDICARE

## 2023-09-13 VITALS — OXYGEN SATURATION: 98 % | HEIGHT: 62 IN | HEART RATE: 74 BPM | BODY MASS INDEX: 29.44 KG/M2 | WEIGHT: 160 LBS

## 2023-09-13 VITALS — SYSTOLIC BLOOD PRESSURE: 128 MMHG | DIASTOLIC BLOOD PRESSURE: 80 MMHG

## 2023-09-13 PROCEDURE — 77067 SCR MAMMO BI INCL CAD: CPT

## 2023-09-13 PROCEDURE — 99213 OFFICE O/P EST LOW 20 MIN: CPT | Mod: 25

## 2023-09-13 PROCEDURE — 77063 BREAST TOMOSYNTHESIS BI: CPT

## 2023-09-13 PROCEDURE — G0008: CPT

## 2023-09-13 PROCEDURE — 90662 IIV NO PRSV INCREASED AG IM: CPT

## 2023-09-13 PROCEDURE — 76641 ULTRASOUND BREAST COMPLETE: CPT | Mod: 50

## 2023-09-28 ENCOUNTER — APPOINTMENT (OUTPATIENT)
Dept: RHEUMATOLOGY | Facility: CLINIC | Age: 73
End: 2023-09-28
Payer: MEDICARE

## 2023-09-28 VITALS
BODY MASS INDEX: 29.63 KG/M2 | HEIGHT: 62 IN | WEIGHT: 161 LBS | OXYGEN SATURATION: 98 % | DIASTOLIC BLOOD PRESSURE: 70 MMHG | SYSTOLIC BLOOD PRESSURE: 128 MMHG | HEART RATE: 68 BPM

## 2023-09-28 DIAGNOSIS — M79.10 MYALGIA, UNSPECIFIED SITE: ICD-10-CM

## 2023-09-28 DIAGNOSIS — M25.50 PAIN IN UNSPECIFIED JOINT: ICD-10-CM

## 2023-09-28 PROCEDURE — 99215 OFFICE O/P EST HI 40 MIN: CPT

## 2023-09-28 PROCEDURE — 99205 OFFICE O/P NEW HI 60 MIN: CPT

## 2023-10-31 ENCOUNTER — RX RENEWAL (OUTPATIENT)
Age: 73
End: 2023-10-31

## 2023-10-31 RX ORDER — APIXABAN 5 MG/1
5 TABLET, FILM COATED ORAL
Qty: 60 | Refills: 0 | Status: ACTIVE | COMMUNITY
Start: 2023-07-19 | End: 1900-01-01

## 2023-12-08 ENCOUNTER — APPOINTMENT (OUTPATIENT)
Dept: INTERNAL MEDICINE | Facility: CLINIC | Age: 73
End: 2023-12-08
Payer: MEDICARE

## 2023-12-08 ENCOUNTER — RX RENEWAL (OUTPATIENT)
Age: 73
End: 2023-12-08

## 2023-12-08 VITALS
TEMPERATURE: 97.9 F | BODY MASS INDEX: 29.63 KG/M2 | HEART RATE: 66 BPM | OXYGEN SATURATION: 98 % | WEIGHT: 161 LBS | HEIGHT: 62 IN

## 2023-12-08 VITALS — DIASTOLIC BLOOD PRESSURE: 72 MMHG | SYSTOLIC BLOOD PRESSURE: 120 MMHG

## 2023-12-08 DIAGNOSIS — R05.9 COUGH, UNSPECIFIED: ICD-10-CM

## 2023-12-08 DIAGNOSIS — J32.9 CHRONIC SINUSITIS, UNSPECIFIED: ICD-10-CM

## 2023-12-08 DIAGNOSIS — B30.9 VIRAL CONJUNCTIVITIS, UNSPECIFIED: ICD-10-CM

## 2023-12-08 PROCEDURE — 99213 OFFICE O/P EST LOW 20 MIN: CPT

## 2024-01-31 ENCOUNTER — NON-APPOINTMENT (OUTPATIENT)
Age: 74
End: 2024-01-31

## 2024-01-31 ENCOUNTER — APPOINTMENT (OUTPATIENT)
Dept: INTERNAL MEDICINE | Facility: CLINIC | Age: 74
End: 2024-01-31
Payer: MEDICARE

## 2024-01-31 VITALS — WEIGHT: 162 LBS | HEIGHT: 62 IN | BODY MASS INDEX: 29.81 KG/M2 | OXYGEN SATURATION: 98 % | HEART RATE: 70 BPM

## 2024-01-31 VITALS — DIASTOLIC BLOOD PRESSURE: 80 MMHG | SYSTOLIC BLOOD PRESSURE: 122 MMHG

## 2024-01-31 DIAGNOSIS — R00.2 PALPITATIONS: ICD-10-CM

## 2024-01-31 DIAGNOSIS — R22.1 LOCALIZED SWELLING, MASS AND LUMP, NECK: ICD-10-CM

## 2024-01-31 PROCEDURE — 99214 OFFICE O/P EST MOD 30 MIN: CPT | Mod: 25

## 2024-01-31 PROCEDURE — 93000 ELECTROCARDIOGRAM COMPLETE: CPT

## 2024-01-31 PROCEDURE — 36415 COLL VENOUS BLD VENIPUNCTURE: CPT

## 2024-01-31 NOTE — PLAN
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 Cardenas  will call pt with results   f/u in 2-4 weeks

## 2024-01-31 NOTE — REVIEW OF SYSTEMS
[Fatigue] : fatigue [Sore Throat] : sore throat [Palpitations] : palpitations [Cough] : cough [Dyspnea on Exertion] : dyspnea on exertion [Negative] : Heme/Lymph

## 2024-01-31 NOTE — HISTORY OF PRESENT ILLNESS
[FreeTextEntry1] : fatigue [de-identified] : 73 Y F with pmh of b/l DVT (1997 LLE, 2023 RLE) on lifelong eliquis, fibromyalgia comes in with multiple complaints. She says last week she had extreme fatigue despite adequate sleep, described as "physical fatigue", felt she was so tired she couldn't do routine tasks at home. Over the past week her fatigue has subsided a bit but her son has noticed that she gets short of breath with minimal exertion. Unable to go up stairs. She also had episode of palpitations yesterday when trying to go up stairs. No chest pain. Denies weight changes, night sweats, hair changes, cold/heat intolerance, vision changes. Has noticed left neck swelling and slight soreness on left side when swallowing. Denies any joint/muscle pain. No recent illnesses. Has had mild dry cough, sporadic, hasn't subsided. No dizziness or lightheadedness, no visual changes, no hearing loss, no difficulty swallowing, no constipation or diarrhea or abdominal pain. Denies fevers/chills. Denies depression or interest loss.

## 2024-02-01 ENCOUNTER — NON-APPOINTMENT (OUTPATIENT)
Age: 74
End: 2024-02-01

## 2024-02-01 ENCOUNTER — APPOINTMENT (OUTPATIENT)
Dept: CARDIOLOGY | Facility: CLINIC | Age: 74
End: 2024-02-01
Payer: MEDICARE

## 2024-02-01 VITALS
HEART RATE: 74 BPM | TEMPERATURE: 97.8 F | SYSTOLIC BLOOD PRESSURE: 148 MMHG | DIASTOLIC BLOOD PRESSURE: 85 MMHG | OXYGEN SATURATION: 95 % | HEIGHT: 62 IN | WEIGHT: 162.06 LBS | BODY MASS INDEX: 29.82 KG/M2

## 2024-02-01 DIAGNOSIS — R94.31 ABNORMAL ELECTROCARDIOGRAM [ECG] [EKG]: ICD-10-CM

## 2024-02-01 PROCEDURE — 93000 ELECTROCARDIOGRAM COMPLETE: CPT

## 2024-02-01 PROCEDURE — G2211 COMPLEX E/M VISIT ADD ON: CPT

## 2024-02-01 PROCEDURE — 99204 OFFICE O/P NEW MOD 45 MIN: CPT

## 2024-02-01 NOTE — HISTORY OF PRESENT ILLNESS
[FreeTextEntry1] : 73F w hx of provoked DVT x2 presents to establish care Sent in by PMD: Dr Campos  pt endorsing sob on exertion. (going up stairs) but can also occur at rest. not on every exertion. no cp. Denies palpitations, dizziness, diaphoresis, syncope, LE edema, orthopnea. pt states significant fatigue and poor exercise tolerance.   Exercise: none Diet: none  Prev cardiac history: murmur  Previous cardiac testing: several years ago, cant remember.  Recent labs:  EKG: SR first degree avb  Med hx: hx of provoked DVT x2 (most recent was july 2023) fibromyalgia OBGYN hx:  2 children.  No Hx of gestational DM, gestational HTN, preeclampsia.  No Hx of miscarriage.  Sx hx: hysterectomy, bladder lift x2, tonsils	 Family hx: F: CAD/MI Social hx:  lives in [place] with , feels safe at home. 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 no etoh/drugs Meds: eliquis  Allergies: reclast

## 2024-02-01 NOTE — DISCUSSION/SUMMARY
[FreeTextEntry1] : 73F w hx of provoked DVT x2 presents to establish care  1. CARROLL -even with minimal exertion -will check tte to r/o structural heart dz (pt also reports hx of murmur) -will check pharm nuclear stress to eval for ischemia  2. hx of provoked dvt x2 -cont eliquis  f/u after initial testing 50 min spent on complete encounter.   [EKG obtained to assist in diagnosis and management of assessed problem(s)] : EKG obtained to assist in diagnosis and management of assessed problem(s)

## 2024-02-01 NOTE — REVIEW OF SYSTEMS
[Fever] : no fever [Headache] : no headache [Weight Gain (___ Lbs)] : no recent weight gain [Chills] : no chills [Feeling Fatigued] : not feeling fatigued [Weight Loss (___ Lbs)] : no recent weight loss [Sore Throat] : no sore throat [SOB] : no shortness of breath [Dyspnea on exertion] : not dyspnea during exertion [Lower Ext Edema] : no extremity edema [Palpitations] : no palpitations [Orthopnea] : no orthopnea [Syncope] : no syncope [Cough] : no cough [Wheezing] : no wheezing [Nausea] : no nausea [Vomiting] : no vomiting [Dizziness] : no dizziness [Confusion] : no confusion was observed [Easy Bleeding] : no tendency for easy bleeding [Easy Bruising] : no tendency for easy bruising

## 2024-02-02 LAB
ALBUMIN SERPL ELPH-MCNC: 4.4 G/DL
ALP BLD-CCNC: 67 U/L
ALT SERPL-CCNC: 14 U/L
ANION GAP SERPL CALC-SCNC: 12 MMOL/L
AST SERPL-CCNC: 15 U/L
BASOPHILS # BLD AUTO: 0.03 K/UL
BASOPHILS NFR BLD AUTO: 0.5 %
BILIRUB SERPL-MCNC: 0.5 MG/DL
BUN SERPL-MCNC: 11 MG/DL
CALCIUM SERPL-MCNC: 9.4 MG/DL
CHLORIDE SERPL-SCNC: 107 MMOL/L
CO2 SERPL-SCNC: 25 MMOL/L
CREAT SERPL-MCNC: 0.59 MG/DL
CRP SERPL-MCNC: <3 MG/L
EGFR: 96 ML/MIN/1.73M2
EOSINOPHIL # BLD AUTO: 0.18 K/UL
EOSINOPHIL NFR BLD AUTO: 2.8 %
ERYTHROCYTE [SEDIMENTATION RATE] IN BLOOD BY WESTERGREN METHOD: 43 MM/HR
GLUCOSE SERPL-MCNC: 86 MG/DL
HCT VFR BLD CALC: 44.6 %
HGB BLD-MCNC: 14.7 G/DL
IMM GRANULOCYTES NFR BLD AUTO: 0.6 %
LYMPHOCYTES # BLD AUTO: 1.79 K/UL
LYMPHOCYTES NFR BLD AUTO: 27.5 %
MAN DIFF?: NORMAL
MCHC RBC-ENTMCNC: 30.1 PG
MCHC RBC-ENTMCNC: 33 GM/DL
MCV RBC AUTO: 91.4 FL
MONOCYTES # BLD AUTO: 0.32 K/UL
MONOCYTES NFR BLD AUTO: 4.9 %
NEUTROPHILS # BLD AUTO: 4.15 K/UL
NEUTROPHILS NFR BLD AUTO: 63.7 %
PLATELET # BLD AUTO: 275 K/UL
POTASSIUM SERPL-SCNC: 4.5 MMOL/L
PROT SERPL-MCNC: 6.7 G/DL
RBC # BLD: 4.88 M/UL
RBC # FLD: 13.6 %
SODIUM SERPL-SCNC: 144 MMOL/L
TSH SERPL-ACNC: 2.47 UIU/ML
WBC # FLD AUTO: 6.51 K/UL

## 2024-02-09 ENCOUNTER — RESULT REVIEW (OUTPATIENT)
Age: 74
End: 2024-02-09

## 2024-02-09 ENCOUNTER — OUTPATIENT (OUTPATIENT)
Dept: OUTPATIENT SERVICES | Facility: HOSPITAL | Age: 74
LOS: 1 days | End: 2024-02-09
Payer: MEDICARE

## 2024-02-09 ENCOUNTER — APPOINTMENT (OUTPATIENT)
Dept: CV DIAGNOSTICS | Facility: HOSPITAL | Age: 74
End: 2024-02-09

## 2024-02-09 DIAGNOSIS — Z98.89 OTHER SPECIFIED POSTPROCEDURAL STATES: Chronic | ICD-10-CM

## 2024-02-09 DIAGNOSIS — I25.10 ATHEROSCLEROTIC HEART DISEASE OF NATIVE CORONARY ARTERY WITHOUT ANGINA PECTORIS: ICD-10-CM

## 2024-02-09 DIAGNOSIS — Z90.710 ACQUIRED ABSENCE OF BOTH CERVIX AND UTERUS: Chronic | ICD-10-CM

## 2024-02-09 DIAGNOSIS — Z90.89 ACQUIRED ABSENCE OF OTHER ORGANS: Chronic | ICD-10-CM

## 2024-02-09 PROCEDURE — 93016 CV STRESS TEST SUPVJ ONLY: CPT | Mod: MH

## 2024-02-09 PROCEDURE — A9500: CPT

## 2024-02-09 PROCEDURE — 93018 CV STRESS TEST I&R ONLY: CPT | Mod: MH

## 2024-02-09 PROCEDURE — 78452 HT MUSCLE IMAGE SPECT MULT: CPT | Mod: MH

## 2024-02-09 PROCEDURE — 78452 HT MUSCLE IMAGE SPECT MULT: CPT | Mod: 26,MH

## 2024-02-09 PROCEDURE — 93017 CV STRESS TEST TRACING ONLY: CPT

## 2024-02-16 ENCOUNTER — APPOINTMENT (OUTPATIENT)
Dept: INTERNAL MEDICINE | Facility: CLINIC | Age: 74
End: 2024-02-16
Payer: MEDICARE

## 2024-02-16 VITALS — WEIGHT: 162 LBS | OXYGEN SATURATION: 98 % | HEART RATE: 72 BPM | BODY MASS INDEX: 29.81 KG/M2 | HEIGHT: 62 IN

## 2024-02-16 VITALS — SYSTOLIC BLOOD PRESSURE: 122 MMHG | DIASTOLIC BLOOD PRESSURE: 84 MMHG

## 2024-02-16 DIAGNOSIS — R53.83 OTHER FATIGUE: ICD-10-CM

## 2024-02-16 DIAGNOSIS — R94.39 ABNORMAL RESULT OF OTHER CARDIOVASCULAR FUNCTION STUDY: ICD-10-CM

## 2024-02-16 PROCEDURE — 99213 OFFICE O/P EST LOW 20 MIN: CPT

## 2024-02-16 NOTE — REVIEW OF SYSTEMS
[Fatigue] : fatigue [Shortness Of Breath] : shortness of breath [Dyspnea on Exertion] : dyspnea on exertion [Negative] : Heme/Lymph

## 2024-02-16 NOTE — ASSESSMENT
[FreeTextEntry1] : spoke with Dr. Cardenas, patient scheduled for cardiac catheterization on Monday, 2/26/24 with Dr. Ahsan Montejo at [facility] relayed info to patient

## 2024-02-16 NOTE — HISTORY OF PRESENT ILLNESS
[FreeTextEntry1] : f/u [de-identified] : Pt here for f/u of fatigue. Saw cardio Dr. Cardenas, did stress test which showed moderate size reversible defect in inferolateral wall c/w ischemia. She says she still feels SOB with exertion and fatigued, no chest pain/leg swelling/abdominal swelling/palpitations.

## 2024-02-22 ENCOUNTER — NON-APPOINTMENT (OUTPATIENT)
Age: 74
End: 2024-02-22

## 2024-02-26 ENCOUNTER — OUTPATIENT (OUTPATIENT)
Dept: OUTPATIENT SERVICES | Facility: HOSPITAL | Age: 74
LOS: 1 days | End: 2024-02-26
Payer: MEDICARE

## 2024-02-26 ENCOUNTER — TRANSCRIPTION ENCOUNTER (OUTPATIENT)
Age: 74
End: 2024-02-26

## 2024-02-26 VITALS
HEIGHT: 62 IN | TEMPERATURE: 98 F | WEIGHT: 167.99 LBS | HEART RATE: 73 BPM | RESPIRATION RATE: 16 BRPM | OXYGEN SATURATION: 100 % | DIASTOLIC BLOOD PRESSURE: 70 MMHG | SYSTOLIC BLOOD PRESSURE: 147 MMHG

## 2024-02-26 VITALS — OXYGEN SATURATION: 95 % | HEART RATE: 71 BPM | RESPIRATION RATE: 16 BRPM | SYSTOLIC BLOOD PRESSURE: 114 MMHG

## 2024-02-26 DIAGNOSIS — R94.39 ABNORMAL RESULT OF OTHER CARDIOVASCULAR FUNCTION STUDY: ICD-10-CM

## 2024-02-26 DIAGNOSIS — Z90.89 ACQUIRED ABSENCE OF OTHER ORGANS: Chronic | ICD-10-CM

## 2024-02-26 DIAGNOSIS — Z98.89 OTHER SPECIFIED POSTPROCEDURAL STATES: Chronic | ICD-10-CM

## 2024-02-26 DIAGNOSIS — Z90.710 ACQUIRED ABSENCE OF BOTH CERVIX AND UTERUS: Chronic | ICD-10-CM

## 2024-02-26 LAB
ALBUMIN SERPL ELPH-MCNC: 4 G/DL — SIGNIFICANT CHANGE UP (ref 3.3–5)
ALP SERPL-CCNC: 59 U/L — SIGNIFICANT CHANGE UP (ref 40–120)
ALT FLD-CCNC: 22 U/L — SIGNIFICANT CHANGE UP (ref 10–45)
ANION GAP SERPL CALC-SCNC: 11 MMOL/L — SIGNIFICANT CHANGE UP (ref 5–17)
AST SERPL-CCNC: 45 U/L — HIGH (ref 10–40)
BILIRUB SERPL-MCNC: 0.8 MG/DL — SIGNIFICANT CHANGE UP (ref 0.2–1.2)
BUN SERPL-MCNC: 18 MG/DL — SIGNIFICANT CHANGE UP (ref 7–23)
CALCIUM SERPL-MCNC: 9.7 MG/DL — SIGNIFICANT CHANGE UP (ref 8.4–10.5)
CHLORIDE SERPL-SCNC: 104 MMOL/L — SIGNIFICANT CHANGE UP (ref 96–108)
CO2 SERPL-SCNC: 24 MMOL/L — SIGNIFICANT CHANGE UP (ref 22–31)
CREAT SERPL-MCNC: 0.59 MG/DL — SIGNIFICANT CHANGE UP (ref 0.5–1.3)
EGFR: 96 ML/MIN/1.73M2 — SIGNIFICANT CHANGE UP
GLUCOSE SERPL-MCNC: 89 MG/DL — SIGNIFICANT CHANGE UP (ref 70–99)
HCT VFR BLD CALC: 43.5 % — SIGNIFICANT CHANGE UP (ref 34.5–45)
HGB BLD-MCNC: 14.2 G/DL — SIGNIFICANT CHANGE UP (ref 11.5–15.5)
MAGNESIUM SERPL-MCNC: 2.2 MG/DL — SIGNIFICANT CHANGE UP (ref 1.6–2.6)
MCHC RBC-ENTMCNC: 29.7 PG — SIGNIFICANT CHANGE UP (ref 27–34)
MCHC RBC-ENTMCNC: 32.6 GM/DL — SIGNIFICANT CHANGE UP (ref 32–36)
MCV RBC AUTO: 91 FL — SIGNIFICANT CHANGE UP (ref 80–100)
NRBC # BLD: 0 /100 WBCS — SIGNIFICANT CHANGE UP (ref 0–0)
PLATELET # BLD AUTO: 240 K/UL — SIGNIFICANT CHANGE UP (ref 150–400)
POTASSIUM SERPL-MCNC: 5.3 MMOL/L — SIGNIFICANT CHANGE UP (ref 3.5–5.3)
POTASSIUM SERPL-SCNC: 5.3 MMOL/L — SIGNIFICANT CHANGE UP (ref 3.5–5.3)
PROT SERPL-MCNC: 7.5 G/DL — SIGNIFICANT CHANGE UP (ref 6–8.3)
RBC # BLD: 4.78 M/UL — SIGNIFICANT CHANGE UP (ref 3.8–5.2)
RBC # FLD: 13 % — SIGNIFICANT CHANGE UP (ref 10.3–14.5)
SODIUM SERPL-SCNC: 139 MMOL/L — SIGNIFICANT CHANGE UP (ref 135–145)
WBC # BLD: 6.39 K/UL — SIGNIFICANT CHANGE UP (ref 3.8–10.5)
WBC # FLD AUTO: 6.39 K/UL — SIGNIFICANT CHANGE UP (ref 3.8–10.5)

## 2024-02-26 PROCEDURE — 83735 ASSAY OF MAGNESIUM: CPT

## 2024-02-26 PROCEDURE — C1769: CPT

## 2024-02-26 PROCEDURE — 93010 ELECTROCARDIOGRAM REPORT: CPT

## 2024-02-26 PROCEDURE — C1887: CPT

## 2024-02-26 PROCEDURE — 0523T NTRAPX C FFR W/3D FUNCJL MAP: CPT

## 2024-02-26 PROCEDURE — C1894: CPT

## 2024-02-26 PROCEDURE — 85027 COMPLETE CBC AUTOMATED: CPT

## 2024-02-26 PROCEDURE — 93454 CORONARY ARTERY ANGIO S&I: CPT | Mod: 26

## 2024-02-26 PROCEDURE — 93454 CORONARY ARTERY ANGIO S&I: CPT

## 2024-02-26 PROCEDURE — 93005 ELECTROCARDIOGRAM TRACING: CPT

## 2024-02-26 PROCEDURE — 80053 COMPREHEN METABOLIC PANEL: CPT

## 2024-02-26 PROCEDURE — 99152 MOD SED SAME PHYS/QHP 5/>YRS: CPT

## 2024-02-26 RX ORDER — APIXABAN 2.5 MG/1
1 TABLET, FILM COATED ORAL
Qty: 0 | Refills: 0 | DISCHARGE

## 2024-02-26 RX ORDER — SODIUM CHLORIDE 9 MG/ML
250 INJECTION INTRAMUSCULAR; INTRAVENOUS; SUBCUTANEOUS
Refills: 0 | Status: DISCONTINUED | OUTPATIENT
Start: 2024-02-26 | End: 2024-03-11

## 2024-02-26 RX ORDER — SODIUM CHLORIDE 9 MG/ML
250 INJECTION INTRAMUSCULAR; INTRAVENOUS; SUBCUTANEOUS ONCE
Refills: 0 | Status: COMPLETED | OUTPATIENT
Start: 2024-02-26 | End: 2024-02-26

## 2024-02-26 RX ADMIN — SODIUM CHLORIDE 250 MILLILITER(S): 9 INJECTION INTRAMUSCULAR; INTRAVENOUS; SUBCUTANEOUS at 08:03

## 2024-02-26 RX ADMIN — SODIUM CHLORIDE 75 MILLILITER(S): 9 INJECTION INTRAMUSCULAR; INTRAVENOUS; SUBCUTANEOUS at 08:04

## 2024-02-26 NOTE — H&P CARDIOLOGY - HISTORY OF PRESENT ILLNESS
This is a 72 yo F w PMH of HLD ( diet controlled), provoked DVT x2 on Eliquis ( last dose 2/24/2024), RA, fibromyalgia c/o of intermittent CARROLL (going up stairs) but also occur at rest; symptoms present since December. Denies CP, palpitations, dizziness, diaphoresis, syncope, LE edema, orthopnea.  Patient states significant fatigue and poor exercise tolerance. Patient underwent NST on 2/9/24 which was abnormal ( see results below). Patient seen and evaluated by Dr. Cardenas. Presents here today for [facility] with Dr. Montejo for further evaluation of CAD.       < from: Nuclear Stress Test-Pharmacologic.. (02.09.24 @ 07:48) >  --------------------------------------------------------------------------------------------------------------------------------------------------------Conclusions:   1. Myocardial Perfusion: Abnormal.   2. Qualitative Perfusion:      - medium-sized, moderate defect(s) in the inferolateral wall that is reversible consistent with ischemia.   3. The left ventricle is normal in function and normal in size. The post stress left ventricular EF is 92 %. The stress end diastolic volume is 47 ml and systolic volume is 4 ml.    < end of copied text >        PMD: Dr Campos

## 2024-02-26 NOTE — H&P CARDIOLOGY - NSICDXPASTMEDICALHX_GEN_ALL_CORE_FT
PAST MEDICAL HISTORY:  DVT, lower extremity     Fibromyalgia     HLD (hyperlipidemia)     Rheumatoid arthritis

## 2024-02-26 NOTE — ASU DISCHARGE PLAN (ADULT/PEDIATRIC) - CARE PROVIDER_API CALL
Noe Cardenas  Cardiovascular Disease  [address] [place] [address]  Phone: [phone]  Fax: [phone]  Follow Up Time: Routine

## 2024-02-26 NOTE — ASU DISCHARGE PLAN (ADULT/PEDIATRIC) - MODE OF TRANSPORTATION
Quality 110: Preventive Care And Screening: Influenza Immunization: Influenza Immunization previously received during influenza season Detail Level: Detailed Ambulatory

## 2024-02-26 NOTE — H&P CARDIOLOGY - ALCOHOL USE HISTORY SINGLE SELECT
never Rifampin Counseling: I discussed with the patient the risks of rifampin including but not limited to liver damage, kidney damage, red-orange body fluids, nausea/vomiting and severe allergy.

## 2024-02-26 NOTE — ASU DISCHARGE PLAN (ADULT/PEDIATRIC) - ASU DC SPECIAL INSTRUCTIONSFT
Wound Care:   the day AFTER your procedure remove bandage GENTTLY, and clean using  mild soap and gentle warm, water stream, pat dry. leave OPEN to air. YOU MAY SHOWER   DO NOT apply lotions, creams, ointments, powder, parfumes to your incision site  DO NOT SOAK your site for 1 week ( no baths, no pools, no tubs, etc...)  Check  your groin and /or wirst daily.A small amount of bruising, and soarness are normal    ACTIVITY: for 24 hours   - DO NOT DRIVE  - DO NOT make any important decisions or sign legal documents   - DO NOT operate heavy machinaries   - you may resume sexual activity in 48 hours, unless otherwise instructed by your cardiologist     If your procedure was done through the WRIST: for the NEXT 3DAYS:  - avoid pushing, pulling, with that affected wrist   - avoid repeated movement of that hand and wrist ( eg: typing, hammering)  - DO NOT LIFT anything more than 5 lbs     If your procedure was done through the GROIN: for the NEXT 5 DAYS  - Limit climbing stairs, DO NOT soak in bathtub or pool  - no strenous activities, pushing, pulling, straining  - Do not lift anything 10lbs or heavier     MEDICATION:   take your medications as explained ( see discharge paperwork)   If you received a STENT, you will be taking antiplatelet medications to KEEP YOUR STENT OPEN ( eg: Aspirin, Plavix, Brilinta, Effient, etc).  Take as prescribed DO NOT STOP taking them without consulting with your cardiologist first.     Follow heart healthy diet reccomended by your doctor, , if you smoke STOP SMOKING ( may call [phone] for center of tobacco control if you need assistance)     CALL your doctor to make appointment in 2 WEEKS     ***CALL YOUR DOCTOR***  if you experience: fever, chills, body aches, or severe pain, swelling, redness, heat or yellow discharge at incision site  If you experience Bleeding or excruciating pain at the procedural site, sweliing ( golf ball size) at your procedural site  If you experience CHEST PAIN  If you experience extremity numbness, tingling, temperature change ( of your procedural site)   If you are unable to reach your doctor, you may contact:   -Cardiology Office at [facility] at [phone] or   - [facility] [phone] - [facility] [phone]

## 2024-02-26 NOTE — ASU DISCHARGE PLAN (ADULT/PEDIATRIC) - DIET/FLUID RESTRICTION
Recent PHQ 2/9 Score    PHQ 2:  Date Adult PHQ 2 Score Adult PHQ 2 Interpretation   12/2/2022 0 No further screening needed       PHQ 9:  Date Adult PHQ 9 Score Adult PHQ 9 Interpretation   8/2/2022 2 Minimal Depression     Health Maintenance Due   Topic Date Due   • COVID-19 Vaccine (1) Never done   • Cervical Cancer Screen 30-64 -  11/01/2017   • Influenza Vaccine (1) 09/01/2022       Patient is due for topics as listed above but is not proceeding with Immunization(s) COVID-19 and Influenza at this time. Education provided for Immunization(s) COVID-19 and Influenza.   No change

## 2024-02-26 NOTE — H&P CARDIOLOGY - NSICDXPASTSURGICALHX_GEN_ALL_CORE_FT
PAST SURGICAL HISTORY:  History of bladder surgery bladder lift, titanium screw to coccyx    S/P hysterectomy     S/P tonsillectomy

## 2024-02-26 NOTE — ASU PATIENT PROFILE, ADULT - FALL HARM RISK - UNIVERSAL INTERVENTIONS
Bed in lowest position, wheels locked, appropriate side rails in place/Call bell, personal items and telephone in reach/Instruct patient to call for assistance before getting out of bed or chair/Non-slip footwear when patient is out of bed/[place] to call system/Physically safe environment - no spills, clutter or unnecessary equipment/Purposeful Proactive Rounding/Room/bathroom lighting operational, light cord in reach

## 2024-02-26 NOTE — ASU PATIENT PROFILE, ADULT - PRO INTERPRETER NEED 2
English

## 2024-02-28 ENCOUNTER — APPOINTMENT (OUTPATIENT)
Dept: INTERNAL MEDICINE | Facility: CLINIC | Age: 74
End: 2024-02-28
Payer: MEDICARE

## 2024-02-28 VITALS — OXYGEN SATURATION: 97 % | HEART RATE: 95 BPM | BODY MASS INDEX: 30.55 KG/M2 | HEIGHT: 62 IN | WEIGHT: 166 LBS

## 2024-02-28 VITALS — SYSTOLIC BLOOD PRESSURE: 110 MMHG | DIASTOLIC BLOOD PRESSURE: 72 MMHG

## 2024-02-28 PROBLEM — I82.409 ACUTE EMBOLISM AND THROMBOSIS OF UNSPECIFIED DEEP VEINS OF UNSPECIFIED LOWER EXTREMITY: Chronic | Status: ACTIVE | Noted: 2024-02-26

## 2024-02-28 PROBLEM — E78.5 HYPERLIPIDEMIA, UNSPECIFIED: Chronic | Status: ACTIVE | Noted: 2024-02-26

## 2024-02-28 PROBLEM — M79.7 FIBROMYALGIA: Chronic | Status: ACTIVE | Noted: 2024-02-26

## 2024-02-28 PROBLEM — M06.9 RHEUMATOID ARTHRITIS, UNSPECIFIED: Chronic | Status: ACTIVE | Noted: 2024-02-26

## 2024-02-28 PROCEDURE — G2211 COMPLEX E/M VISIT ADD ON: CPT

## 2024-02-28 PROCEDURE — 99213 OFFICE O/P EST LOW 20 MIN: CPT

## 2024-02-28 NOTE — HISTORY OF PRESENT ILLNESS
[FreeTextEntry1] : post-cath f/u [de-identified] : Pt here after having cardiac cath. Results showing 40% stenosis in LAD, no stent. Wasn't started on meds. Asking for new cardiologist. No cp/sob. Says her fatigue slightly improved.

## 2024-02-28 NOTE — ASSESSMENT
[FreeTextEntry1] : #CAD started on lipitor 40 and asa 81 already on eliquis 5mg bid for dvt x2 discussed r/b of asa while on eliquis, including bleeding, GI ulcers which could be life-threatening  asking for new cardiologist; gave referral for Dr. Leroy  rtc 2 months for fbw

## 2024-03-04 ENCOUNTER — APPOINTMENT (OUTPATIENT)
Dept: CARDIOLOGY | Facility: CLINIC | Age: 74
End: 2024-03-04

## 2024-03-05 ENCOUNTER — APPOINTMENT (OUTPATIENT)
Dept: CARDIOLOGY | Facility: CLINIC | Age: 74
End: 2024-03-05
Payer: MEDICARE

## 2024-03-05 VITALS
DIASTOLIC BLOOD PRESSURE: 85 MMHG | WEIGHT: 166 LBS | SYSTOLIC BLOOD PRESSURE: 147 MMHG | BODY MASS INDEX: 30.55 KG/M2 | HEART RATE: 67 BPM | OXYGEN SATURATION: 93 % | HEIGHT: 62 IN

## 2024-03-05 PROCEDURE — G2211 COMPLEX E/M VISIT ADD ON: CPT

## 2024-03-05 PROCEDURE — 93000 ELECTROCARDIOGRAM COMPLETE: CPT

## 2024-03-05 PROCEDURE — 99215 OFFICE O/P EST HI 40 MIN: CPT

## 2024-03-05 RX ORDER — FLUTICASONE PROPIONATE 50 UG/1
50 SPRAY, METERED NASAL TWICE DAILY
Qty: 48 | Refills: 0 | Status: DISCONTINUED | COMMUNITY
Start: 2023-12-08 | End: 2024-03-05

## 2024-03-05 RX ORDER — PREDNISONE 10 MG/1
10 TABLET ORAL DAILY
Qty: 15 | Refills: 0 | Status: DISCONTINUED | COMMUNITY
Start: 2023-09-28 | End: 2024-03-05

## 2024-03-05 RX ORDER — TRIAMCINOLONE ACETONIDE 1 MG/G
0.1 CREAM TOPICAL
Qty: 80 | Refills: 0 | Status: DISCONTINUED | COMMUNITY
Start: 2022-08-09 | End: 2024-03-05

## 2024-03-05 RX ORDER — PREDNISONE 10 MG/1
10 TABLET ORAL
Qty: 12 | Refills: 0 | Status: DISCONTINUED | COMMUNITY
Start: 2023-12-08 | End: 2024-03-05

## 2024-03-05 RX ORDER — AZITHROMYCIN 250 MG/1
250 TABLET, FILM COATED ORAL
Qty: 1 | Refills: 0 | Status: DISCONTINUED | COMMUNITY
Start: 2023-12-08 | End: 2024-03-05

## 2024-03-05 NOTE — CARDIOLOGY SUMMARY
[de-identified] : 3/5/24 -sinus rhythm at a rate of 68 bpm.  First-degree AV block.  Nonspecific diminished voltage in the precordial leads.  Nonspecific RSR' pattern in leads V1-V2.  Nonspecific poor R wave progression in leads V5-V6.

## 2024-03-05 NOTE — DISCUSSION/SUMMARY
[FreeTextEntry1] : Mrs. Cox reports having been experiencing dyspnea on exertion in association with climbing stairs, as well as dyspnea when feeling anxious.  Coronary angiography performed on 2/26/2024 revealed nonobstructive coronary artery disease (40% mid-LAD lesion) with an LVEDP of 18.  Her cardiac examination today is remarkable for a grade II/VI systolic ejection murmur.  Her blood pressure reading today is mildly elevated.  Her electrocardiogram today reveals sinus rhythm with first-degree AV block, nonspecific diminished voltage in the precordial leads, a nonspecific RSR' pattern in leads V1-V2, and nonspecific poor R wave progression in leads V5-V6.  Although the patient is exhibiting a mildly elevated blood pressure reading today, she states that her blood pressure readings are typically in the normal range.  Therefore, I am not going to initiate antihypertensive therapy at this time, however, her blood pressure will be followed.  I have reviewed the findings of the coronary angiogram of 2/26/2024 in detail with the patient today, explaining the finding of nonobstructive coronary artery disease.  I have explained to the patient that her symptom of dyspnea does not appear to be related to obstructive coronary artery disease.  For further cardiac evaluation of this symptom, I am referring the patient for echocardiography to evaluate cardiac structural integrity, left ventricular function, valvular morphology and function, and the pulmonary artery systolic pressure.  She is going to make arrangements to have this study performed through our office, and I will telephone her to discuss the findings, once the study has been completed.  In view of the finding of nonobstructive coronary artery disease, I have instructed the patient to continue statin therapy and aspirin therapy.  She will be having follow-up blood testing performed through the office of her PCP in May 2024 for reassessment of the lipid profile on Lipitor 40 mg daily.  The importance of dietary modification, weight loss, and regular exercise as tolerated was discussed with the patient today.  I have asked the patient to call me if she should have any questions or problems pertaining to these matters, especially if she should experience any concerning symptoms.  I have otherwise to return to the office for follow-up cardiac evaluation and blood pressure reassessment in 4 months, provided she remains clinically stable in the interim, and the findings on the upcoming echocardiogram do not warrant sooner evaluation and/or treatment.  If she continues to experience dyspnea and the echocardiogram is unrevealing, consultation with a pulmonologist will be recommended to the patient. [EKG obtained to assist in diagnosis and management of assessed problem(s)] : EKG obtained to assist in diagnosis and management of assessed problem(s)

## 2024-03-05 NOTE — PHYSICAL EXAM
[No Acute Distress] : no acute distress [Normal Conjunctiva] : normal conjunctiva [No Carotid Bruit] : no carotid bruit [Normal S1, S2] : normal S1, S2 [No Gallop] : no gallop [Murmur] : murmur [Clear Lung Fields] : clear lung fields [No Respiratory Distress] : no respiratory distress  [Soft] : abdomen soft [Non Tender] : non-tender [Normal Gait] : normal gait [No Edema] : no edema [No Rash] : no rash [Moves all extremities] : moves all extremities [Alert and Oriented] : alert and oriented [de-identified] : Moderately overweight white female [de-identified] : No JVD is appreciated at a 45 degree angle [de-identified] : II/VI systolic ejection murmur

## 2024-03-05 NOTE — HISTORY OF PRESENT ILLNESS
[FreeTextEntry1] : Mrs. Marie Cox presented to the office today for a second opinion cardiology consultation.  She was referred here by Dr. Ledbetter.  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 who was recently discovered to have nonobstructive coronary artery disease on coronary angiography performed on 2/26/2024 for further evaluation of dyspnea and an abnormal pharmacological nuclear stress study.  She has a history of DVT x 2 (LLE DVT of thigh 1997 after a long car ride, right soleal vein 7/10/2023 after a long car ride; now on lifelong anticoagulation after consulting with hematologist, , 9/25/2023), a dyslipidemia, osteoarthritis, rheumatoid arthritis, fibromyalgia, anxiety, osteoporosis, bladder lift surgery x 2 (2004, 2005), DIETER/BSO 1997 ("benign tumor"), and a tonsillectomy.  The patient consulted with a cardiologist on 2/1/2024 regarding dyspnea on exertion in association with climbing stairs.  Pharmacological nuclear stress testing performed on 2/9/2024 was negative for the inducement of cardiac symptoms, electrocardiographic evidence of myocardial ischemia, or significant arrhythmias.  The cardiac imaging portion of the study was interpreted as revealing inferolateral myocardial ischemia with normal left ventricular systolic function (EF 92%).  Coronary angiography performed via the RRA on 2/26/2024 revealed the left main coronary artery to be normal.  A 40% stenosis was noted involving the mid-portion of the left anterior descending artery.  Minor luminal irregularities were noted involving the circumflex and right coronary arteries.  The LVEDP was 18.  Therapy with Lipitor 40 mg daily and aspirin 81 mg daily was subsequently initiated.  The patient has continued to experience dyspnea on exertion in association with climbing stairs, as well as dyspnea when feeling anxious.  She has not experienced any symptoms of chest discomfort in association with her activities.  She has not noted orthopnea, paroxysmal nocturnal dyspnea, or lower extremity edema.  She has not experienced any episodes of palpitations, presyncope or syncope.   As far as factors for coronary artery disease are concerned, the patient has a dyslipidemia.  She does not have a history of diabetes or hypertension.  She discontinued cigarette smoking at age 26, having smoked <1ppd x 4 years at that time.  She reports a family history of premature coronary artery disease, stating that her father suffered "a heart attack" at age 59 and subsequently underwent CABG a few years later.  Pharmacological nuclear stress testing performed on 2/9/2024 was negative for the inducement of cardiac symptoms, electrocardiographic evidence of myocardial ischemia, or significant arrhythmias.  The cardiac imaging portion of the study was interpreted as revealing inferolateral myocardial ischemia with normal left ventricular systolic function (EF 92%).  Coronary angiography performed via the RRA on 2/26/2024 revealed the left main coronary artery to be normal.  A 40% stenosis was noted involving the mid-portion of the left anterior descending artery.  Minor luminal irregularities were noted involving the circumflex and right coronary arteries.  The LVEDP was 18.

## 2024-03-19 ENCOUNTER — APPOINTMENT (OUTPATIENT)
Dept: CARDIOLOGY | Facility: CLINIC | Age: 74
End: 2024-03-19
Payer: MEDICARE

## 2024-03-19 PROCEDURE — 93306 TTE W/DOPPLER COMPLETE: CPT

## 2024-04-29 ENCOUNTER — APPOINTMENT (OUTPATIENT)
Dept: INTERNAL MEDICINE | Facility: CLINIC | Age: 74
End: 2024-04-29
Payer: MEDICARE

## 2024-04-29 VITALS — HEART RATE: 77 BPM | BODY MASS INDEX: 30.18 KG/M2 | OXYGEN SATURATION: 98 % | HEIGHT: 62 IN | WEIGHT: 164 LBS

## 2024-04-29 VITALS — DIASTOLIC BLOOD PRESSURE: 64 MMHG | SYSTOLIC BLOOD PRESSURE: 126 MMHG

## 2024-04-29 DIAGNOSIS — E78.5 HYPERLIPIDEMIA, UNSPECIFIED: ICD-10-CM

## 2024-04-29 DIAGNOSIS — R06.00 DYSPNEA, UNSPECIFIED: ICD-10-CM

## 2024-04-29 DIAGNOSIS — M79.7 FIBROMYALGIA: ICD-10-CM

## 2024-04-29 DIAGNOSIS — I82.409 ACUTE EMBOLISM AND THROMBOSIS OF UNSPECIFIED DEEP VEINS OF UNSPECIFIED LOWER EXTREMITY: ICD-10-CM

## 2024-04-29 DIAGNOSIS — R01.1 CARDIAC MURMUR, UNSPECIFIED: ICD-10-CM

## 2024-04-29 DIAGNOSIS — Z00.00 ENCOUNTER FOR GENERAL ADULT MEDICAL EXAMINATION W/OUT ABNORMAL FINDINGS: ICD-10-CM

## 2024-04-29 DIAGNOSIS — I25.10 ATHEROSCLEROTIC HEART DISEASE OF NATIVE CORONARY ARTERY W/OUT ANGINA PECTORIS: ICD-10-CM

## 2024-04-29 PROCEDURE — 36415 COLL VENOUS BLD VENIPUNCTURE: CPT

## 2024-04-29 PROCEDURE — 99213 OFFICE O/P EST LOW 20 MIN: CPT

## 2024-04-29 PROCEDURE — G2211 COMPLEX E/M VISIT ADD ON: CPT

## 2024-04-29 NOTE — PHYSICAL EXAM
[No Acute Distress] : no acute distress [Well Nourished] : well nourished [Well Developed] : well developed [Well-Appearing] : well-appearing [Normal Sclera/Conjunctiva] : normal sclera/conjunctiva [PERRL] : pupils equal round and reactive to light [EOMI] : extraocular movements intact [Normal Outer Ear/Nose] : the outer ears and nose were normal in appearance [Normal Oropharynx] : the oropharynx was normal [No JVD] : no jugular venous distention [No Lymphadenopathy] : no lymphadenopathy [Supple] : supple [Thyroid Normal, No Nodules] : the thyroid was normal and there were no nodules present [No Respiratory Distress] : no respiratory distress  [No Accessory Muscle Use] : no accessory muscle use [Clear to Auscultation] : lungs were clear to auscultation bilaterally [Normal Rate] : normal rate  [Regular Rhythm] : with a regular rhythm [Normal S1, S2] : normal S1 and S2 [No Carotid Bruits] : no carotid bruits [No Abdominal Bruit] : a ~M bruit was not heard ~T in the abdomen [No Varicosities] : no varicosities [Pedal Pulses Present] : the pedal pulses are present [No Edema] : there was no peripheral edema [No Palpable Aorta] : no palpable aorta [No Extremity Clubbing/Cyanosis] : no extremity clubbing/cyanosis [Soft] : abdomen soft [Non Tender] : non-tender [Non-distended] : non-distended [No Masses] : no abdominal mass palpated [No HSM] : no HSM [Normal Bowel Sounds] : normal bowel sounds [Normal Posterior Cervical Nodes] : no posterior cervical lymphadenopathy [Normal Anterior Cervical Nodes] : no anterior cervical lymphadenopathy [No CVA Tenderness] : no CVA  tenderness [No Spinal Tenderness] : no spinal tenderness [No Joint Swelling] : no joint swelling [Grossly Normal Strength/Tone] : grossly normal strength/tone [No Rash] : no rash [Coordination Grossly Intact] : coordination grossly intact [No Focal Deficits] : no focal deficits [Normal Gait] : normal gait [Deep Tendon Reflexes (DTR)] : deep tendon reflexes were 2+ and symmetric [Normal Affect] : the affect was normal [Normal Insight/Judgement] : insight and judgment were intact [de-identified] : II/VI systolic ejection murmur

## 2024-04-29 NOTE — ASSESSMENT
[FreeTextEntry1] : recommended pulm for CARROLL, patient refusing; lungs clear, saturating well on RA at rest. recommend f/u with rheumatologist for fibromyalgia patient says dyspnea is improved and does not want to continue to investigate, feels it has to do with fibromyalgia

## 2024-04-29 NOTE — HISTORY OF PRESENT ILLNESS
[FreeTextEntry1] : FBW, bp check [de-identified] : Pt here for FBW and BP check. She saw Dr. Addison Leroy cardio in March, had ECHO done which was normal. She says she feels well, still has slight dyspnea on exertion, but improved from previous. She thinks it has to do with her fibromyalgia. No chest pain, palpitations, cough, fevers, chills, unexplained weight changes, nausea, vomiting, diarrhea, constipation, urinary symptoms.

## 2024-05-01 LAB
ALBUMIN SERPL ELPH-MCNC: 4.2 G/DL
ALP BLD-CCNC: 78 U/L
ALT SERPL-CCNC: 23 U/L
ANION GAP SERPL CALC-SCNC: 12 MMOL/L
AST SERPL-CCNC: 19 U/L
BASOPHILS # BLD AUTO: 0.03 K/UL
BASOPHILS NFR BLD AUTO: 0.6 %
BILIRUB SERPL-MCNC: 0.6 MG/DL
BUN SERPL-MCNC: 7 MG/DL
CALCIUM SERPL-MCNC: 9.9 MG/DL
CHLORIDE SERPL-SCNC: 106 MMOL/L
CHOLEST SERPL-MCNC: 155 MG/DL
CO2 SERPL-SCNC: 26 MMOL/L
CREAT SERPL-MCNC: 0.66 MG/DL
EGFR: 94 ML/MIN/1.73M2
EOSINOPHIL # BLD AUTO: 0.24 K/UL
EOSINOPHIL NFR BLD AUTO: 4.5 %
ESTIMATED AVERAGE GLUCOSE: 105 MG/DL
GLUCOSE SERPL-MCNC: 92 MG/DL
HBA1C MFR BLD HPLC: 5.3 %
HCT VFR BLD CALC: 45.7 %
HDLC SERPL-MCNC: 65 MG/DL
HGB BLD-MCNC: 14.7 G/DL
IMM GRANULOCYTES NFR BLD AUTO: 0 %
LDLC SERPL CALC-MCNC: 72 MG/DL
LYMPHOCYTES # BLD AUTO: 1.6 K/UL
LYMPHOCYTES NFR BLD AUTO: 30 %
MAN DIFF?: NORMAL
MCHC RBC-ENTMCNC: 29.4 PG
MCHC RBC-ENTMCNC: 32.2 GM/DL
MCV RBC AUTO: 91.4 FL
MONOCYTES # BLD AUTO: 0.3 K/UL
MONOCYTES NFR BLD AUTO: 5.6 %
NEUTROPHILS # BLD AUTO: 3.17 K/UL
NEUTROPHILS NFR BLD AUTO: 59.3 %
NONHDLC SERPL-MCNC: 90 MG/DL
PLATELET # BLD AUTO: 229 K/UL
POTASSIUM SERPL-SCNC: 4.5 MMOL/L
PROT SERPL-MCNC: 6.8 G/DL
RBC # BLD: 5 M/UL
RBC # FLD: 13.2 %
SODIUM SERPL-SCNC: 143 MMOL/L
TRIGL SERPL-MCNC: 98 MG/DL
TSH SERPL-ACNC: 1.55 UIU/ML
WBC # FLD AUTO: 5.34 K/UL

## 2024-05-29 RX ORDER — ATORVASTATIN CALCIUM 40 MG/1
40 TABLET, FILM COATED ORAL
Qty: 90 | Refills: 0 | Status: ACTIVE | COMMUNITY
Start: 2024-02-28 | End: 1900-01-01

## 2024-06-24 ENCOUNTER — TRANSCRIPTION ENCOUNTER (OUTPATIENT)
Age: 74
End: 2024-06-24

## 2024-06-24 ENCOUNTER — APPOINTMENT (OUTPATIENT)
Dept: INTERNAL MEDICINE | Facility: CLINIC | Age: 74
End: 2024-06-24
Payer: MEDICARE

## 2024-06-24 ENCOUNTER — NON-APPOINTMENT (OUTPATIENT)
Age: 74
End: 2024-06-24

## 2024-06-24 VITALS
TEMPERATURE: 98.1 F | OXYGEN SATURATION: 97 % | HEIGHT: 62 IN | RESPIRATION RATE: 16 BRPM | WEIGHT: 164 LBS | BODY MASS INDEX: 30.18 KG/M2 | HEART RATE: 70 BPM

## 2024-06-24 VITALS — DIASTOLIC BLOOD PRESSURE: 66 MMHG | SYSTOLIC BLOOD PRESSURE: 122 MMHG

## 2024-06-24 DIAGNOSIS — T23.262A BURN OF SECOND DEGREE OF BACK OF LEFT HAND, INITIAL ENCOUNTER: ICD-10-CM

## 2024-06-24 PROCEDURE — G2211 COMPLEX E/M VISIT ADD ON: CPT

## 2024-06-24 PROCEDURE — 99213 OFFICE O/P EST LOW 20 MIN: CPT

## 2024-06-24 RX ORDER — SILVER SULFADIAZINE 10 MG/G
1 CREAM TOPICAL TWICE DAILY
Qty: 50 | Refills: 0 | Status: ACTIVE | COMMUNITY
Start: 2024-06-24 | End: 1900-01-01

## 2024-06-26 ENCOUNTER — APPOINTMENT (OUTPATIENT)
Dept: INTERNAL MEDICINE | Facility: CLINIC | Age: 74
End: 2024-06-26

## 2024-06-26 VITALS — WEIGHT: 164 LBS | HEIGHT: 62 IN | BODY MASS INDEX: 30.18 KG/M2 | HEART RATE: 67 BPM | OXYGEN SATURATION: 98 %

## 2024-06-26 VITALS — SYSTOLIC BLOOD PRESSURE: 124 MMHG | TEMPERATURE: 97.8 F | DIASTOLIC BLOOD PRESSURE: 62 MMHG

## 2024-06-26 DIAGNOSIS — T23.202S: ICD-10-CM

## 2024-06-26 PROCEDURE — 99213 OFFICE O/P EST LOW 20 MIN: CPT

## 2024-06-26 PROCEDURE — G2211 COMPLEX E/M VISIT ADD ON: CPT

## 2024-06-26 RX ORDER — DOXYCYCLINE HYCLATE 100 MG/1
100 TABLET ORAL
Qty: 14 | Refills: 0 | Status: ACTIVE | COMMUNITY
Start: 2024-06-26 | End: 1900-01-01

## 2024-09-10 ENCOUNTER — APPOINTMENT (OUTPATIENT)
Dept: OBGYN | Facility: CLINIC | Age: 74
End: 2024-09-10

## 2024-09-10 VITALS
WEIGHT: 163 LBS | BODY MASS INDEX: 30 KG/M2 | SYSTOLIC BLOOD PRESSURE: 148 MMHG | HEIGHT: 62 IN | DIASTOLIC BLOOD PRESSURE: 84 MMHG

## 2024-09-10 DIAGNOSIS — Z01.419 ENCOUNTER FOR GYNECOLOGICAL EXAMINATION (GENERAL) (ROUTINE) W/OUT ABNORMAL FINDINGS: ICD-10-CM

## 2024-09-10 PROCEDURE — 99212 OFFICE O/P EST SF 10 MIN: CPT

## 2024-09-16 ENCOUNTER — RESULT REVIEW (OUTPATIENT)
Age: 74
End: 2024-09-16

## 2024-09-16 ENCOUNTER — APPOINTMENT (OUTPATIENT)
Dept: INTERNAL MEDICINE | Facility: CLINIC | Age: 74
End: 2024-09-16
Payer: MEDICARE

## 2024-09-16 ENCOUNTER — APPOINTMENT (OUTPATIENT)
Dept: ULTRASOUND IMAGING | Facility: CLINIC | Age: 74
End: 2024-09-16
Payer: MEDICARE

## 2024-09-16 ENCOUNTER — APPOINTMENT (OUTPATIENT)
Dept: RADIOLOGY | Facility: CLINIC | Age: 74
End: 2024-09-16
Payer: MEDICARE

## 2024-09-16 ENCOUNTER — APPOINTMENT (OUTPATIENT)
Dept: MAMMOGRAPHY | Facility: CLINIC | Age: 74
End: 2024-09-16
Payer: MEDICARE

## 2024-09-16 VITALS — SYSTOLIC BLOOD PRESSURE: 110 MMHG | DIASTOLIC BLOOD PRESSURE: 58 MMHG

## 2024-09-16 VITALS — OXYGEN SATURATION: 97 % | HEIGHT: 62 IN | HEART RATE: 66 BPM | WEIGHT: 163 LBS | BODY MASS INDEX: 30 KG/M2

## 2024-09-16 DIAGNOSIS — E78.5 HYPERLIPIDEMIA, UNSPECIFIED: ICD-10-CM

## 2024-09-16 DIAGNOSIS — I82.409 ACUTE EMBOLISM AND THROMBOSIS OF UNSPECIFIED DEEP VEINS OF UNSPECIFIED LOWER EXTREMITY: ICD-10-CM

## 2024-09-16 DIAGNOSIS — R41.3 OTHER AMNESIA: ICD-10-CM

## 2024-09-16 DIAGNOSIS — I25.10 ATHEROSCLEROTIC HEART DISEASE OF NATIVE CORONARY ARTERY W/OUT ANGINA PECTORIS: ICD-10-CM

## 2024-09-16 DIAGNOSIS — Z23 ENCOUNTER FOR IMMUNIZATION: ICD-10-CM

## 2024-09-16 PROCEDURE — 36415 COLL VENOUS BLD VENIPUNCTURE: CPT

## 2024-09-16 PROCEDURE — 77067 SCR MAMMO BI INCL CAD: CPT

## 2024-09-16 PROCEDURE — G0008: CPT

## 2024-09-16 PROCEDURE — G0439: CPT

## 2024-09-16 PROCEDURE — 99214 OFFICE O/P EST MOD 30 MIN: CPT | Mod: 25

## 2024-09-16 PROCEDURE — 77080 DXA BONE DENSITY AXIAL: CPT

## 2024-09-16 PROCEDURE — 76641 ULTRASOUND BREAST COMPLETE: CPT | Mod: 50

## 2024-09-16 PROCEDURE — 77063 BREAST TOMOSYNTHESIS BI: CPT

## 2024-09-16 PROCEDURE — 90662 IIV NO PRSV INCREASED AG IM: CPT

## 2024-09-16 NOTE — HISTORY OF PRESENT ILLNESS
[FreeTextEntry1] : annual wellness visit [de-identified] : Pt comes in for annual wellness visit and flu shot and labs. She says recently she and her family have noticed worsening memory loss. For example, she cooked meatballs the other day and said "I haven't made these in years" when in reality her son said she made them 2 weeks prior. She has trouble remembering names and recent events. Follows with GYN Dr. Petty, going for mammo today. UTD with colonoscopy, due in 2026. Needs repeat DEXA. Follows with Dr. Fanny villalpando/onc, and Dr. Cardenas cardio. No other complaints at this time.  No fevers/chills/CP/SOB/palpitations/abdominal pain/nausea/vomiting/diarrhea/recent illnesses/sick contacts.

## 2024-09-16 NOTE — PHYSICAL EXAM
[No Acute Distress] : no acute distress [Well Nourished] : well nourished [Well Developed] : well developed [Well-Appearing] : well-appearing [Normal Sclera/Conjunctiva] : normal sclera/conjunctiva [PERRL] : pupils equal round and reactive to light [EOMI] : extraocular movements intact [Normal Outer Ear/Nose] : the outer ears and nose were normal in appearance [Normal Oropharynx] : the oropharynx was normal [No JVD] : no jugular venous distention [No Lymphadenopathy] : no lymphadenopathy [Supple] : supple [Thyroid Normal, No Nodules] : the thyroid was normal and there were no nodules present [No Respiratory Distress] : no respiratory distress  [No Accessory Muscle Use] : no accessory muscle use [Clear to Auscultation] : lungs were clear to auscultation bilaterally [Normal Rate] : normal rate  [Regular Rhythm] : with a regular rhythm [Normal S1, S2] : normal S1 and S2 [No Murmur] : no murmur heard [No Carotid Bruits] : no carotid bruits [No Abdominal Bruit] : a ~M bruit was not heard ~T in the abdomen [No Varicosities] : no varicosities [Pedal Pulses Present] : the pedal pulses are present [No Edema] : there was no peripheral edema [No Palpable Aorta] : no palpable aorta [No Extremity Clubbing/Cyanosis] : no extremity clubbing/cyanosis [Soft] : abdomen soft [Non Tender] : non-tender [Non-distended] : non-distended [No Masses] : no abdominal mass palpated [No HSM] : no HSM [Normal Bowel Sounds] : normal bowel sounds [Normal Posterior Cervical Nodes] : no posterior cervical lymphadenopathy [Normal Anterior Cervical Nodes] : no anterior cervical lymphadenopathy [No CVA Tenderness] : no CVA  tenderness [No Spinal Tenderness] : no spinal tenderness [No Joint Swelling] : no joint swelling [Grossly Normal Strength/Tone] : grossly normal strength/tone [No Rash] : no rash [Coordination Grossly Intact] : coordination grossly intact [No Focal Deficits] : no focal deficits [Normal Gait] : normal gait [Deep Tendon Reflexes (DTR)] : deep tendon reflexes were 2+ and symmetric [Normal Affect] : the affect was normal [Normal Insight/Judgement] : insight and judgment were intact [de-identified] : serial 7's, able to spell world backwards; unable to recall 3 objects after 2 mins

## 2024-09-16 NOTE — ASSESSMENT
[FreeTextEntry1] : 1) Memory loss has seen neuro in past and had MRI's thinks it is worsening will r/o organic causes cbc cmp tsh a1c lipid ua b12/folate 25-oh vit d neuro referral Dr. Gaston Monroe has seen group in past   2) H/o osteoporosis will repeat DEXA, check 25-oh vit d c/w walking, exercising  3) H/o CAD/HLD patient stopped lipitor will repeat lipid panel  had EKG in March 2024 NSR rate 68 1st degree av block nonspecific PRWP v5-6 patient refusing repeat encouraged to f/u with cardio  4) H/o b/l LE DVT follows with Dr. Escobedo on Eliquis patient thinks it is affecting her memory? Doubtful, encouraged to f/u with heme/onc prior to discontinuing  5) HCM flu shot given today refused repeat ekg utd pna vax utd colonoscopy due 01/2026 has appt for mammo today repeat DEXA labs today  f/u in 2-3 months

## 2024-09-16 NOTE — HEALTH RISK ASSESSMENT
[Yes] : Yes [Never (0 pts)] : Never (0 points) [No] : In the past 12 months have you used drugs other than those required for medical reasons? No [No falls in past year] : Patient reported no falls in the past year [Little interest or pleasure doing things] : 1) Little interest or pleasure doing things [Feeling down, depressed, or hopeless] : 2) Feeling down, depressed, or hopeless [0] : 2) Feeling down, depressed, or hopeless: Not at all (0) [PHQ-2 Negative - No further assessment needed] : PHQ-2 Negative - No further assessment needed [NO] : No [Patient reported mammogram was normal] : Patient reported mammogram was normal [Patient declined PAP Smear] : Patient declined PAP Smear [Patient reported bone density results were abnormal] : Patient reported bone density results were abnormal [Patient reported colonoscopy was normal] : Patient reported colonoscopy was normal [HIV test declined] : HIV test declined [Hepatitis C test declined] : Hepatitis C test declined [Change in mental status noted] : Change in mental status noted [Learning/Retaining New Information] : difficulty learning/retaining new information [None] : None [With Significant Other] : lives with significant other [# of Members in Household ___] :  household currently consist of [unfilled] member(s) [Retired] : retired [Graduate School] : graduate school [] :  [# Of Children ___] : has [unfilled] children [Sexually Active] : sexually active [Feels Safe at Home] : Feels safe at home [Fully functional (bathing, dressing, toileting, transferring, walking, feeding)] : Fully functional (bathing, dressing, toileting, transferring, walking, feeding) [Fully functional (using the telephone, shopping, preparing meals, housekeeping, doing laundry, using] : Fully functional and needs no help or supervision to perform IADLs (using the telephone, shopping, preparing meals, housekeeping, doing laundry, using transportation, managing medications and managing finances) [Reports changes in hearing] : Reports changes in hearing [Reports normal functional visual acuity (ie: able to read med bottle)] : Reports normal functional visual acuity [With Patient/Caregiver] : , with patient/caregiver [Reviewed no changes] : Reviewed, no changes [Designated Healthcare Proxy] : Designated healthcare proxy [Name: ___] : Health Care Proxy's Name: [unfilled]  [Relationship: ___] : Relationship: [unfilled] [I will adhere to the patient's wishes.] : I will adhere to the patient's wishes. [Audit-CScore] : 0 [de-identified] : walking started yesterday  [de-identified] : poor [[id number]] : 0 [Language] : denies difficulty with language [Behavior] : denies difficulty with behavior [Handling Complex Tasks] : denies difficulty handling complex tasks [Reasoning] : denies difficulty with reasoning [Spatial Ability and Orientation] : denies difficulty with spatial ability and orientation [High Risk Behavior] : no high risk behavior [Reports changes in vision] : Reports no changes in vision [Reports changes in dental health] : Reports no changes in dental health [MammogramDate] : 09/24 [MammogramComments] : has appt today  [PapSmearComments] : h/o hysterectomy [BoneDensityDate] : 09/22 [BoneDensityComments] : osteoporosis [ColonoscopyDate] : 01/21 [ColonoscopyComments] : due 2026 [de-identified] : more forgetful  [de-identified] : will make audiologist appt  [AdvancecareDate] : 09/24 [FreeTextEntry4] : gave pt HCP form to complete will bring it in

## 2024-09-20 DIAGNOSIS — M81.0 AGE-RELATED OSTEOPOROSIS W/OUT CURRENT PATHOLOGICAL FRACTURE: ICD-10-CM

## 2024-09-20 LAB
25(OH)D3 SERPL-MCNC: 23.3 NG/ML
ALBUMIN SERPL ELPH-MCNC: 4.3 G/DL
ALP BLD-CCNC: 82 U/L
ALT SERPL-CCNC: 14 U/L
ANION GAP SERPL CALC-SCNC: 13 MMOL/L
APPEARANCE: CLEAR
AST SERPL-CCNC: 18 U/L
BACTERIA: NEGATIVE /HPF
BASOPHILS # BLD AUTO: 0.03 K/UL
BASOPHILS NFR BLD AUTO: 0.5 %
BILIRUB SERPL-MCNC: 0.6 MG/DL
BILIRUBIN URINE: NEGATIVE
BLOOD URINE: NEGATIVE
BUN SERPL-MCNC: 12 MG/DL
CALCIUM SERPL-MCNC: 9.4 MG/DL
CAST: 0 /LPF
CHLORIDE SERPL-SCNC: 106 MMOL/L
CHOLEST SERPL-MCNC: 251 MG/DL
CO2 SERPL-SCNC: 23 MMOL/L
COLOR: NORMAL
CREAT SERPL-MCNC: 0.55 MG/DL
EGFR: 98 ML/MIN/1.73M2
EOSINOPHIL # BLD AUTO: 0.24 K/UL
EOSINOPHIL NFR BLD AUTO: 3.7 %
EPITHELIAL CELLS: 1 /HPF
ESTIMATED AVERAGE GLUCOSE: 105 MG/DL
FOLATE SERPL-MCNC: 11.1 NG/ML
GLUCOSE QUALITATIVE U: NEGATIVE MG/DL
GLUCOSE SERPL-MCNC: 80 MG/DL
HBA1C MFR BLD HPLC: 5.3 %
HCT VFR BLD CALC: 45 %
HDLC SERPL-MCNC: 65 MG/DL
HGB BLD-MCNC: 14.3 G/DL
IMM GRANULOCYTES NFR BLD AUTO: 0.5 %
KETONES URINE: NEGATIVE MG/DL
LDLC SERPL CALC-MCNC: 166 MG/DL
LEUKOCYTE ESTERASE URINE: ABNORMAL
LYMPHOCYTES # BLD AUTO: 1.64 K/UL
LYMPHOCYTES NFR BLD AUTO: 25.1 %
MAN DIFF?: NORMAL
MCHC RBC-ENTMCNC: 29.9 PG
MCHC RBC-ENTMCNC: 31.8 GM/DL
MCV RBC AUTO: 93.9 FL
MICROSCOPIC-UA: NORMAL
MONOCYTES # BLD AUTO: 0.38 K/UL
MONOCYTES NFR BLD AUTO: 5.8 %
NEUTROPHILS # BLD AUTO: 4.22 K/UL
NEUTROPHILS NFR BLD AUTO: 64.4 %
NITRITE URINE: NEGATIVE
NONHDLC SERPL-MCNC: 186 MG/DL
PH URINE: 7
PLATELET # BLD AUTO: 260 K/UL
POTASSIUM SERPL-SCNC: 4.5 MMOL/L
PROT SERPL-MCNC: 7.1 G/DL
PROTEIN URINE: NEGATIVE MG/DL
RBC # BLD: 4.79 M/UL
RBC # FLD: 13.7 %
RED BLOOD CELLS URINE: 3 /HPF
SODIUM SERPL-SCNC: 141 MMOL/L
SPECIFIC GRAVITY URINE: 1.02
TRIGL SERPL-MCNC: 113 MG/DL
TSH SERPL-ACNC: 1.6 UIU/ML
UROBILINOGEN URINE: 1 MG/DL
VIT B12 SERPL-MCNC: 604 PG/ML
WBC # FLD AUTO: 6.54 K/UL
WHITE BLOOD CELLS URINE: 1 /HPF

## 2024-09-20 RX ORDER — DENOSUMAB 60 MG/ML
60 INJECTION SUBCUTANEOUS
Qty: 1 | Refills: 1 | Status: ACTIVE | COMMUNITY
Start: 2024-09-20 | End: 1900-01-01

## 2024-09-20 RX ORDER — MULTIVIT-MIN/IRON/FOLIC ACID/K 18-600-40
50 MCG CAPSULE ORAL
Qty: 90 | Refills: 0 | Status: ACTIVE | COMMUNITY
Start: 2024-09-20 | End: 1900-01-01

## 2025-02-05 ENCOUNTER — APPOINTMENT (OUTPATIENT)
Dept: INTERNAL MEDICINE | Facility: CLINIC | Age: 75
End: 2025-02-05
Payer: MEDICARE

## 2025-02-05 VITALS
HEART RATE: 77 BPM | TEMPERATURE: 98.5 F | HEIGHT: 62 IN | WEIGHT: 165 LBS | OXYGEN SATURATION: 98 % | BODY MASS INDEX: 30.36 KG/M2

## 2025-02-05 VITALS — SYSTOLIC BLOOD PRESSURE: 112 MMHG | DIASTOLIC BLOOD PRESSURE: 70 MMHG

## 2025-02-05 DIAGNOSIS — J20.9 ACUTE BRONCHITIS, UNSPECIFIED: ICD-10-CM

## 2025-02-05 PROCEDURE — 99213 OFFICE O/P EST LOW 20 MIN: CPT

## 2025-02-05 PROCEDURE — G2211 COMPLEX E/M VISIT ADD ON: CPT

## 2025-02-05 RX ORDER — AZITHROMYCIN 250 MG/1
250 TABLET, FILM COATED ORAL
Qty: 1 | Refills: 0 | Status: ACTIVE | COMMUNITY
Start: 2025-02-05 | End: 1900-01-01

## 2025-02-05 RX ORDER — FLUTICASONE PROPIONATE 50 UG/1
50 SPRAY, METERED NASAL DAILY
Qty: 1 | Refills: 4 | Status: ACTIVE | COMMUNITY
Start: 2025-02-05 | End: 1900-01-01

## 2025-02-05 RX ORDER — PREDNISONE 10 MG/1
10 TABLET ORAL
Qty: 9 | Refills: 0 | Status: ACTIVE | COMMUNITY
Start: 2025-02-05 | End: 1900-01-01

## 2025-05-30 ENCOUNTER — APPOINTMENT (OUTPATIENT)
Dept: INTERNAL MEDICINE | Facility: CLINIC | Age: 75
End: 2025-05-30
Payer: MEDICARE

## 2025-05-30 VITALS — WEIGHT: 165 LBS | HEIGHT: 62 IN | BODY MASS INDEX: 30.36 KG/M2 | HEART RATE: 77 BPM | OXYGEN SATURATION: 99 %

## 2025-05-30 VITALS — SYSTOLIC BLOOD PRESSURE: 118 MMHG | DIASTOLIC BLOOD PRESSURE: 70 MMHG

## 2025-05-30 DIAGNOSIS — R53.83 OTHER FATIGUE: ICD-10-CM

## 2025-05-30 DIAGNOSIS — I82.409 ACUTE EMBOLISM AND THROMBOSIS OF UNSPECIFIED DEEP VEINS OF UNSPECIFIED LOWER EXTREMITY: ICD-10-CM

## 2025-05-30 DIAGNOSIS — M79.7 FIBROMYALGIA: ICD-10-CM

## 2025-05-30 DIAGNOSIS — E78.5 HYPERLIPIDEMIA, UNSPECIFIED: ICD-10-CM

## 2025-05-30 PROCEDURE — G2211 COMPLEX E/M VISIT ADD ON: CPT

## 2025-05-30 PROCEDURE — 36415 COLL VENOUS BLD VENIPUNCTURE: CPT

## 2025-05-30 PROCEDURE — 99214 OFFICE O/P EST MOD 30 MIN: CPT

## 2025-06-02 LAB
ALBUMIN SERPL ELPH-MCNC: 4.2 G/DL
ALP BLD-CCNC: 91 U/L
ALT SERPL-CCNC: 27 U/L
ANION GAP SERPL CALC-SCNC: 15 MMOL/L
AST SERPL-CCNC: 22 U/L
BASOPHILS # BLD AUTO: 0.06 K/UL
BASOPHILS NFR BLD AUTO: 0.8 %
BILIRUB SERPL-MCNC: 0.5 MG/DL
BUN SERPL-MCNC: 14 MG/DL
CALCIUM SERPL-MCNC: 9.7 MG/DL
CHLORIDE SERPL-SCNC: 106 MMOL/L
CHOLEST SERPL-MCNC: 147 MG/DL
CO2 SERPL-SCNC: 23 MMOL/L
CREAT SERPL-MCNC: 0.67 MG/DL
EGFRCR SERPLBLD CKD-EPI 2021: 93 ML/MIN/1.73M2
EOSINOPHIL # BLD AUTO: 0.36 K/UL
EOSINOPHIL NFR BLD AUTO: 4.9 %
GLUCOSE SERPL-MCNC: 91 MG/DL
HCT VFR BLD CALC: 43.5 %
HDLC SERPL-MCNC: 58 MG/DL
HGB BLD-MCNC: 13.8 G/DL
IMM GRANULOCYTES NFR BLD AUTO: 0.3 %
LDLC SERPL-MCNC: 60 MG/DL
LYMPHOCYTES # BLD AUTO: 1.88 K/UL
LYMPHOCYTES NFR BLD AUTO: 25.4 %
MAN DIFF?: NORMAL
MCHC RBC-ENTMCNC: 29.4 PG
MCHC RBC-ENTMCNC: 31.7 G/DL
MCV RBC AUTO: 92.6 FL
MONOCYTES # BLD AUTO: 0.38 K/UL
MONOCYTES NFR BLD AUTO: 5.1 %
NEUTROPHILS # BLD AUTO: 4.7 K/UL
NEUTROPHILS NFR BLD AUTO: 63.5 %
NONHDLC SERPL-MCNC: 89 MG/DL
PLATELET # BLD AUTO: 259 K/UL
POTASSIUM SERPL-SCNC: 4.2 MMOL/L
PROT SERPL-MCNC: 6.7 G/DL
RBC # BLD: 4.7 M/UL
RBC # FLD: 13.3 %
SODIUM SERPL-SCNC: 144 MMOL/L
TRIGL SERPL-MCNC: 178 MG/DL
TSH SERPL-ACNC: 1.49 UIU/ML
WBC # FLD AUTO: 7.4 K/UL

## 2025-07-11 ENCOUNTER — EMERGENCY (EMERGENCY)
Facility: HOSPITAL | Age: 75
LOS: 1 days | End: 2025-07-11
Attending: STUDENT IN AN ORGANIZED HEALTH CARE EDUCATION/TRAINING PROGRAM | Admitting: STUDENT IN AN ORGANIZED HEALTH CARE EDUCATION/TRAINING PROGRAM
Payer: MEDICARE

## 2025-07-11 VITALS
HEART RATE: 65 BPM | OXYGEN SATURATION: 99 % | RESPIRATION RATE: 17 BRPM | DIASTOLIC BLOOD PRESSURE: 81 MMHG | TEMPERATURE: 98 F | SYSTOLIC BLOOD PRESSURE: 138 MMHG | WEIGHT: 138.01 LBS | HEIGHT: 64 IN

## 2025-07-11 DIAGNOSIS — Z90.710 ACQUIRED ABSENCE OF BOTH CERVIX AND UTERUS: Chronic | ICD-10-CM

## 2025-07-11 DIAGNOSIS — Z98.89 OTHER SPECIFIED POSTPROCEDURAL STATES: Chronic | ICD-10-CM

## 2025-07-11 DIAGNOSIS — Z90.89 ACQUIRED ABSENCE OF OTHER ORGANS: Chronic | ICD-10-CM

## 2025-07-11 PROCEDURE — 73630 X-RAY EXAM OF FOOT: CPT | Mod: 26,LT

## 2025-07-11 PROCEDURE — 99283 EMERGENCY DEPT VISIT LOW MDM: CPT

## 2025-07-11 PROCEDURE — 73630 X-RAY EXAM OF FOOT: CPT

## 2025-07-11 PROCEDURE — 99284 EMERGENCY DEPT VISIT MOD MDM: CPT

## 2025-07-11 RX ORDER — ASPIRIN 325 MG
1 TABLET ORAL
Refills: 0 | DISCHARGE

## 2025-07-11 RX ORDER — DONEPEZIL HYDROCHLORIDE 5 MG/1
1 TABLET ORAL
Refills: 0 | DISCHARGE

## 2025-07-11 RX ORDER — ACETAMINOPHEN 500 MG/5ML
975 LIQUID (ML) ORAL ONCE
Refills: 0 | Status: COMPLETED | OUTPATIENT
Start: 2025-07-11 | End: 2025-07-11

## 2025-07-11 RX ORDER — IBUPROFEN 200 MG
400 TABLET ORAL ONCE
Refills: 0 | Status: COMPLETED | OUTPATIENT
Start: 2025-07-11 | End: 2025-07-11

## 2025-07-11 RX ADMIN — Medication 400 MILLIGRAM(S): at 18:35

## 2025-07-11 RX ADMIN — Medication 975 MILLIGRAM(S): at 18:38

## 2025-07-11 NOTE — ED ADULT TRIAGE NOTE - CHIEF COMPLAINT QUOTE
Patient presents to ED with complaint of left foot pain after hitting foot on bottom of bed. Alert and oriented x 4.

## 2025-07-11 NOTE — ED PROVIDER NOTE - CLINICAL SUMMARY MEDICAL DECISION MAKING FREE TEXT BOX
74-year-old female presenting to the ED with complaints of left foot pain after hitting foot on the bed frame, patient reports no other reported injuries complaining of left dorsal foot pain and swelling along 1st and 2nd proximal toe.  Patient ambulating with steady gait without assistance, has left hand pain, given Tylenol Motrin x-ray of the foot negative for acute fractures, will refer to outpatient podiatry, return precautions discussed verbalized and agreeable discharge plan.

## 2025-07-11 NOTE — ED PROVIDER NOTE - PHYSICAL EXAMINATION
VITAL SIGNS: I have reviewed nursing notes and confirm.  CONSTITUTIONAL: well-appearing, non-toxic, NAD  SKIN: Warm dry, normal skin turgor  HEAD: NCAT  EXT: Full ROM, no bony tenderness, left dorsal foot swelling neurovascularly intact   NEURO: normal motor. normal sensory. Normal gait.  PSYCH: Cooperative, appropriate.

## 2025-07-11 NOTE — ED PROVIDER NOTE - PATIENT PORTAL LINK FT
You can access the FollowMyHealth Patient Portal offered by [facility] by registering at the following website: [email or web address]. By joining [facility]’s FollowMyHealth portal, you will also be able to view your health information using other applications (apps) compatible with our system.

## 2025-07-11 NOTE — ED ADULT TRIAGE NOTE - NSWEIGHTCALCTOOLDRUG_GEN_A_CORE
Pt called stating that she has had 2 IV infusions of enytvio and was supposed to be scheduled next week for a 3rd but the nurses in the infusion center stated that she shouold be starting on the pens. I asked if she had heard from [facility] for pt assistance or from the specialty pharmacy about delivery. She stated she had not. I do not see anything about a denial scanned to her chart. I advised pt that I would call both places Monday morning to see what is going on.     used

## 2025-07-12 NOTE — CHART NOTE - NSCHARTNOTEFT_GEN_A_CORE
The ED  called pt to assist with follow appt.  Pt is a 75 y/o female  presented to ED for left foot pain.  Attending has recommended podiatry follow up.  Pt did not respond to the call. As per [facility], pt has scheduled primary care appt with Giorgio Monson on 9/17/25.

## 2025-09-17 ENCOUNTER — APPOINTMENT (OUTPATIENT)
Age: 75
End: 2025-09-17
Payer: MEDICARE

## 2025-09-17 VITALS — OXYGEN SATURATION: 98 % | HEART RATE: 77 BPM | HEIGHT: 62 IN | WEIGHT: 165.38 LBS | BODY MASS INDEX: 30.44 KG/M2

## 2025-09-17 VITALS — DIASTOLIC BLOOD PRESSURE: 78 MMHG | SYSTOLIC BLOOD PRESSURE: 128 MMHG

## 2025-09-17 DIAGNOSIS — E55.9 VITAMIN D DEFICIENCY, UNSPECIFIED: ICD-10-CM

## 2025-09-17 DIAGNOSIS — Z00.00 ENCOUNTER FOR GENERAL ADULT MEDICAL EXAMINATION W/OUT ABNORMAL FINDINGS: ICD-10-CM

## 2025-09-17 DIAGNOSIS — M79.7 FIBROMYALGIA: ICD-10-CM

## 2025-09-17 DIAGNOSIS — I25.10 ATHEROSCLEROTIC HEART DISEASE OF NATIVE CORONARY ARTERY W/OUT ANGINA PECTORIS: ICD-10-CM

## 2025-09-17 DIAGNOSIS — R94.31 ABNORMAL ELECTROCARDIOGRAM [ECG] [EKG]: ICD-10-CM

## 2025-09-17 DIAGNOSIS — M81.0 AGE-RELATED OSTEOPOROSIS W/OUT CURRENT PATHOLOGICAL FRACTURE: ICD-10-CM

## 2025-09-17 DIAGNOSIS — R41.3 OTHER AMNESIA: ICD-10-CM

## 2025-09-17 DIAGNOSIS — I82.409 ACUTE EMBOLISM AND THROMBOSIS OF UNSPECIFIED DEEP VEINS OF UNSPECIFIED LOWER EXTREMITY: ICD-10-CM

## 2025-09-17 DIAGNOSIS — E78.5 HYPERLIPIDEMIA, UNSPECIFIED: ICD-10-CM

## 2025-09-17 DIAGNOSIS — E53.8 DEFICIENCY OF OTHER SPECIFIED B GROUP VITAMINS: ICD-10-CM

## 2025-09-17 PROCEDURE — G0008: CPT

## 2025-09-17 PROCEDURE — 93000 ELECTROCARDIOGRAM COMPLETE: CPT

## 2025-09-17 PROCEDURE — 90662 IIV NO PRSV INCREASED AG IM: CPT

## 2025-09-17 PROCEDURE — 36415 COLL VENOUS BLD VENIPUNCTURE: CPT

## 2025-09-17 PROCEDURE — G2211 COMPLEX E/M VISIT ADD ON: CPT

## 2025-09-17 PROCEDURE — G0439: CPT

## 2025-09-17 PROCEDURE — 99214 OFFICE O/P EST MOD 30 MIN: CPT | Mod: 25

## 2025-09-19 LAB
25(OH)D3 SERPL-MCNC: 30.3 NG/ML
ALBUMIN SERPL ELPH-MCNC: 4.1 G/DL
ALP BLD-CCNC: 88 U/L
ALT SERPL-CCNC: 19 U/L
ANION GAP SERPL CALC-SCNC: 13 MMOL/L
APPEARANCE: CLEAR
AST SERPL-CCNC: 20 U/L
BACTERIA: NEGATIVE /HPF
BASOPHILS # BLD AUTO: 0.06 K/UL
BASOPHILS NFR BLD AUTO: 0.9 %
BILIRUB SERPL-MCNC: 0.8 MG/DL
BILIRUBIN URINE: NEGATIVE
BLOOD URINE: NEGATIVE
BUN SERPL-MCNC: 15 MG/DL
CALCIUM SERPL-MCNC: 9.5 MG/DL
CAST: 0 /LPF
CHLORIDE SERPL-SCNC: 104 MMOL/L
CHOLEST SERPL-MCNC: 173 MG/DL
CO2 SERPL-SCNC: 26 MMOL/L
COLOR: YELLOW
CREAT SERPL-MCNC: 0.69 MG/DL
EGFRCR SERPLBLD CKD-EPI 2021: 92 ML/MIN/1.73M2
EOSINOPHIL # BLD AUTO: 0.54 K/UL
EOSINOPHIL NFR BLD AUTO: 8.4 %
EPITHELIAL CELLS: 1 /HPF
GLUCOSE QUALITATIVE U: NEGATIVE MG/DL
GLUCOSE SERPL-MCNC: 94 MG/DL
HCT VFR BLD CALC: 43.3 %
HDLC SERPL-MCNC: 59 MG/DL
HGB BLD-MCNC: 14 G/DL
IMM GRANULOCYTES NFR BLD AUTO: 0.3 %
KETONES URINE: NEGATIVE MG/DL
LDLC SERPL-MCNC: 98 MG/DL
LEUKOCYTE ESTERASE URINE: NEGATIVE
LYMPHOCYTES # BLD AUTO: 1.72 K/UL
LYMPHOCYTES NFR BLD AUTO: 26.6 %
MAN DIFF?: NORMAL
MCHC RBC-ENTMCNC: 29.7 PG
MCHC RBC-ENTMCNC: 32.3 G/DL
MCV RBC AUTO: 91.9 FL
MICROSCOPIC-UA: NORMAL
MONOCYTES # BLD AUTO: 0.42 K/UL
MONOCYTES NFR BLD AUTO: 6.5 %
NEUTROPHILS # BLD AUTO: 3.7 K/UL
NEUTROPHILS NFR BLD AUTO: 57.3 %
NITRITE URINE: NEGATIVE
NONHDLC SERPL-MCNC: 115 MG/DL
PH URINE: 7
PLATELET # BLD AUTO: 246 K/UL
POTASSIUM SERPL-SCNC: 4.3 MMOL/L
PROT SERPL-MCNC: 6.9 G/DL
PROTEIN URINE: NEGATIVE MG/DL
RBC # BLD: 4.71 M/UL
RBC # FLD: 13.4 %
RED BLOOD CELLS URINE: 3 /HPF
SODIUM SERPL-SCNC: 142 MMOL/L
SPECIFIC GRAVITY URINE: 1.02
TRIGL SERPL-MCNC: 93 MG/DL
TSH SERPL-ACNC: 2.23 UIU/ML
UROBILINOGEN URINE: 0.2 MG/DL
VIT B12 SERPL-MCNC: >2000 PG/ML
WBC # FLD AUTO: 6.46 K/UL
WHITE BLOOD CELLS URINE: 0 /HPF